# Patient Record
Sex: FEMALE | Race: WHITE | NOT HISPANIC OR LATINO | URBAN - METROPOLITAN AREA
[De-identification: names, ages, dates, MRNs, and addresses within clinical notes are randomized per-mention and may not be internally consistent; named-entity substitution may affect disease eponyms.]

---

## 2017-05-15 RX ORDER — SODIUM CHLORIDE 9 MG/ML
500 INJECTION INTRAMUSCULAR; INTRAVENOUS; SUBCUTANEOUS
Qty: 0 | Refills: 0 | Status: DISCONTINUED | OUTPATIENT
Start: 2017-05-16 | End: 2017-05-16

## 2017-05-15 NOTE — H&P ADULT - FAMILY HISTORY
Sibling  Still living? Unknown  Family history of pancreatic cancer, Age at diagnosis: Age Unknown  Family history of breast cancer, Age at diagnosis: Age Unknown  Family history of prostate cancer, Age at diagnosis: Age Unknown

## 2017-05-15 NOTE — H&P ADULT - ASSESSMENT
60 yo female, current heavy smoker (1.5 PPD x 50 years) with a PMHx of Bipolar disorder, schizoaffective disorder, panic attacks and depression, known non-obstructive CAD, COPD (prior hospitalizations, denies intubations), HTN, Hyperlipidemia, fibromyalgia, DMII, TIA (diagnosed two months prior; pt reports no deficits but is awaiting MRI/MRA of head for further work up) who presents for bilateral lower extremity angiogram with possible intervention if clinically indicated secondary to LE claudication and abnormal US/PVR findings.      ASA: III and Mallampati: III 62 yo female, current heavy smoker (1.5 PPD x 50 years) with a PMHx of Bipolar disorder, schizoaffective disorder, panic attacks and depression, known non-obstructive CAD, COPD (prior hospitalizations, denies intubations), HTN, Hyperlipidemia, fibromyalgia, DMII, TIA (diagnosed two months prior; pt reports no deficits but is awaiting MRI/MRA of head for further work up) who presents for bilateral lower extremity angiogram with possible intervention if clinically indicated secondary to LE claudication and abnormal US/PVR findings.      ASA: III and Mallampati: III  Pt was loaded with ASA 325mg po x 1 and Plavix 600mg PO X 1 pre-procedure.

## 2017-05-15 NOTE — H&P ADULT - HISTORY OF PRESENT ILLNESS
History obtained from patient (poor historian) and MD office note     60 yo female, current heavy smoker (1.5 PPD x 50 years) with a PMHx of Bipolar disorder, schizoaffective disorder, panic attacks and depression, known non-obstructive CAD, COPD (prior hospitalizations, denies intubations), HTN, Hyperlipidemia, fibromyalgia, DMII, TIA (diagnosed two months prior; pt reports no deficits but is awaiting MRI/MRA of head for further work up) presented to Dr. Yañez’s office with the complaint of bilateral leg claudication after walking one city block (R>L) relieved with rest x several years. Pt explains she experiences calf cramping and sharp shooting pain in lower back/buttocks region with radiation to upper thighs when ambulating. In recent months, her feet are cool to touch but denies skin breakdown, leg edema.  Arterial US (2017): Right: moderate arterial insufficiency due to infrapopliteal disease.  Left: No arterial Insufficiency. PVR on right le.82, PVR on left: 1.1. In light of patient’s risk factors, presenting symptoms and Arterial US/PVR findings, pt now presents for peripheral angiogram with possible intervention if clinically indicated.     Prior Cardiac Testing  Per MD office note: Echo (date unknown): LVEF 75%, no WMA, abnormal LV relaxation. Grade 1 diastolic dysfunction.   Prior NST (date unknown, documented in MD office note): negative for ischemia. History obtained from patient (poor historian) and MD office note     60 yo female, current heavy smoker (1.5 PPD x 50 years) with a PMHx of Bipolar disorder, schizoaffective disorder, panic attacks and depression, known non-obstructive CAD, COPD (prior hospitalizations, denies intubations), HTN, Hyperlipidemia, fibromyalgia, DMII, TIA (diagnosed two months prior; pt reports no deficits but is awaiting MRI/MRA of head for further work up) presented to Dr. Yañez’s office with the complaint of bilateral leg claudication after walking one city block (R>L) relieved with rest x several years. Pt explains she experiences calf cramping and sharp shooting pain in lower back/buttocks region with radiation to upper thighs when ambulating. In recent months, her feet are cool to touch but denies skin breakdown, leg edema.  Arterial US (2017): Right: moderate arterial insufficiency due to infrapopliteal disease.  Left: No arterial Insufficiency. PVR on right le.82, PVR on left: 1.1. In light of patient’s risk factors, presenting symptoms and Arterial US/PVR findings, pt now presents for peripheral angiogram with possible intervention if clinically indicated.      Prior Cardiac Testing  Per MD office note: Echo (date unknown): LVEF 75%, no WMA, abnormal LV relaxation. Grade 1 diastolic dysfunction.   Prior NST (date unknown, documented in MD office note): negative for ischemia.

## 2017-05-15 NOTE — H&P ADULT - PMH
Bipolar disorder    COPD (chronic obstructive pulmonary disease)    Hypertension    PVD (peripheral vascular disease)

## 2017-05-16 ENCOUNTER — OUTPATIENT (OUTPATIENT)
Dept: OUTPATIENT SERVICES | Facility: HOSPITAL | Age: 62
LOS: 1 days | Discharge: NURSING, REHAB OR LONG-TERM CARE | End: 2017-05-16
Payer: COMMERCIAL

## 2017-05-16 DIAGNOSIS — I73.9 PERIPHERAL VASCULAR DISEASE, UNSPECIFIED: ICD-10-CM

## 2017-05-16 DIAGNOSIS — F17.210 NICOTINE DEPENDENCE, CIGARETTES, UNCOMPLICATED: ICD-10-CM

## 2017-05-16 DIAGNOSIS — E78.5 HYPERLIPIDEMIA, UNSPECIFIED: ICD-10-CM

## 2017-05-16 DIAGNOSIS — Z82.49 FAMILY HISTORY OF ISCHEMIC HEART DISEASE AND OTHER DISEASES OF THE CIRCULATORY SYSTEM: ICD-10-CM

## 2017-05-16 DIAGNOSIS — E11.9 TYPE 2 DIABETES MELLITUS WITHOUT COMPLICATIONS: ICD-10-CM

## 2017-05-16 DIAGNOSIS — J98.4 OTHER DISORDERS OF LUNG: ICD-10-CM

## 2017-05-16 DIAGNOSIS — J33.8 OTHER POLYP OF SINUS: Chronic | ICD-10-CM

## 2017-05-16 DIAGNOSIS — I70.213 ATHEROSCLEROSIS OF NATIVE ARTERIES OF EXTREMITIES WITH INTERMITTENT CLAUDICATION, BILATERAL LEGS: ICD-10-CM

## 2017-05-16 DIAGNOSIS — Z90.89 ACQUIRED ABSENCE OF OTHER ORGANS: Chronic | ICD-10-CM

## 2017-05-16 DIAGNOSIS — I67.9 CEREBROVASCULAR DISEASE, UNSPECIFIED: ICD-10-CM

## 2017-05-16 DIAGNOSIS — N83.201 UNSPECIFIED OVARIAN CYST, RIGHT SIDE: Chronic | ICD-10-CM

## 2017-05-16 LAB
ANION GAP SERPL CALC-SCNC: 5 MMOL/L — LOW (ref 9–16)
APTT BLD: 28.1 SEC — SIGNIFICANT CHANGE UP (ref 27.5–37.4)
BASOPHILS NFR BLD AUTO: 1 % — SIGNIFICANT CHANGE UP (ref 0–2)
BUN SERPL-MCNC: 15 MG/DL — SIGNIFICANT CHANGE UP (ref 7–23)
CALCIUM SERPL-MCNC: 10 MG/DL — SIGNIFICANT CHANGE UP (ref 8.5–10.5)
CHLORIDE SERPL-SCNC: 99 MMOL/L — SIGNIFICANT CHANGE UP (ref 96–108)
CHOLEST SERPL-MCNC: 153 MG/DL — SIGNIFICANT CHANGE UP
CO2 SERPL-SCNC: 31 MMOL/L — SIGNIFICANT CHANGE UP (ref 22–31)
CREAT SERPL-MCNC: 0.79 MG/DL — SIGNIFICANT CHANGE UP (ref 0.5–1.3)
EOSINOPHIL NFR BLD AUTO: 1.4 % — SIGNIFICANT CHANGE UP (ref 0–6)
GLUCOSE SERPL-MCNC: 87 MG/DL — SIGNIFICANT CHANGE UP (ref 70–99)
HBA1C BLD-MCNC: 6.7 % — HIGH (ref 4.8–5.6)
HCT VFR BLD CALC: 38.9 % — SIGNIFICANT CHANGE UP (ref 34.5–45)
HDLC SERPL-MCNC: 25 MG/DL — LOW
HGB BLD-MCNC: 13.2 G/DL — SIGNIFICANT CHANGE UP (ref 11.5–15.5)
INR BLD: 1.02 — SIGNIFICANT CHANGE UP (ref 0.88–1.16)
LIPID PNL WITH DIRECT LDL SERPL: 87 MG/DL — SIGNIFICANT CHANGE UP
LYMPHOCYTES # BLD AUTO: 37.7 % — SIGNIFICANT CHANGE UP (ref 13–44)
MCHC RBC-ENTMCNC: 29.5 PG — SIGNIFICANT CHANGE UP (ref 27–34)
MCHC RBC-ENTMCNC: 33.9 G/DL — SIGNIFICANT CHANGE UP (ref 32–36)
MCV RBC AUTO: 87 FL — SIGNIFICANT CHANGE UP (ref 80–100)
MONOCYTES NFR BLD AUTO: 6.9 % — SIGNIFICANT CHANGE UP (ref 2–14)
NEUTROPHILS NFR BLD AUTO: 53 % — SIGNIFICANT CHANGE UP (ref 43–77)
PLATELET # BLD AUTO: 385 K/UL — SIGNIFICANT CHANGE UP (ref 150–400)
POTASSIUM SERPL-MCNC: 4.6 MMOL/L — SIGNIFICANT CHANGE UP (ref 3.5–5.3)
POTASSIUM SERPL-SCNC: 4.6 MMOL/L — SIGNIFICANT CHANGE UP (ref 3.5–5.3)
PROTHROM AB SERPL-ACNC: 11.3 SEC — SIGNIFICANT CHANGE UP (ref 9.8–12.7)
RBC # BLD: 4.47 M/UL — SIGNIFICANT CHANGE UP (ref 3.8–5.2)
RBC # FLD: 14.4 % — SIGNIFICANT CHANGE UP (ref 10.3–16.9)
SODIUM SERPL-SCNC: 135 MMOL/L — SIGNIFICANT CHANGE UP (ref 135–145)
TOTAL CHOLESTEROL/HDL RATIO MEASUREMENT: 6.1 RATIO — HIGH
TRIGL SERPL-MCNC: 203 MG/DL — HIGH
WBC # BLD: 8.9 K/UL — SIGNIFICANT CHANGE UP (ref 3.8–10.5)
WBC # FLD AUTO: 8.9 K/UL — SIGNIFICANT CHANGE UP (ref 3.8–10.5)

## 2017-05-16 PROCEDURE — 93010 ELECTROCARDIOGRAM REPORT: CPT

## 2017-05-16 PROCEDURE — 85730 THROMBOPLASTIN TIME PARTIAL: CPT

## 2017-05-16 PROCEDURE — C1887: CPT

## 2017-05-16 PROCEDURE — C1769: CPT

## 2017-05-16 PROCEDURE — 36245 INS CATH ABD/L-EXT ART 1ST: CPT

## 2017-05-16 PROCEDURE — 36246 INS CATH ABD/L-EXT ART 2ND: CPT | Mod: 50

## 2017-05-16 PROCEDURE — C1760: CPT

## 2017-05-16 PROCEDURE — C1894: CPT

## 2017-05-16 PROCEDURE — 83036 HEMOGLOBIN GLYCOSYLATED A1C: CPT

## 2017-05-16 PROCEDURE — 85610 PROTHROMBIN TIME: CPT

## 2017-05-16 PROCEDURE — 80061 LIPID PANEL: CPT

## 2017-05-16 PROCEDURE — 85025 COMPLETE CBC W/AUTO DIFF WBC: CPT

## 2017-05-16 PROCEDURE — 80048 BASIC METABOLIC PNL TOTAL CA: CPT

## 2017-05-16 PROCEDURE — 93005 ELECTROCARDIOGRAM TRACING: CPT

## 2017-05-16 PROCEDURE — 76937 US GUIDE VASCULAR ACCESS: CPT | Mod: 26

## 2017-05-16 PROCEDURE — 75716 ARTERY X-RAYS ARMS/LEGS: CPT | Mod: 26,59

## 2017-05-16 RX ORDER — CLOPIDOGREL BISULFATE 75 MG/1
1 TABLET, FILM COATED ORAL
Qty: 30 | Refills: 5 | OUTPATIENT
Start: 2017-05-16 | End: 2017-11-11

## 2017-05-16 RX ORDER — SIMVASTATIN 20 MG/1
1 TABLET, FILM COATED ORAL
Qty: 0 | Refills: 0 | COMMUNITY

## 2017-05-16 RX ORDER — CLOPIDOGREL BISULFATE 75 MG/1
600 TABLET, FILM COATED ORAL ONCE
Qty: 0 | Refills: 0 | Status: COMPLETED | OUTPATIENT
Start: 2017-05-16 | End: 2017-05-16

## 2017-05-16 RX ORDER — CHLORHEXIDINE GLUCONATE 213 G/1000ML
1 SOLUTION TOPICAL ONCE
Qty: 0 | Refills: 0 | Status: DISCONTINUED | OUTPATIENT
Start: 2017-05-16 | End: 2017-05-16

## 2017-05-16 RX ORDER — ASPIRIN/CALCIUM CARB/MAGNESIUM 324 MG
1 TABLET ORAL
Qty: 30 | Refills: 5 | OUTPATIENT
Start: 2017-05-16 | End: 2017-11-11

## 2017-05-16 RX ORDER — ACETAMINOPHEN 500 MG
650 TABLET ORAL ONCE
Qty: 0 | Refills: 0 | Status: COMPLETED | OUTPATIENT
Start: 2017-05-16 | End: 2017-05-16

## 2017-05-16 RX ORDER — NICOTINE POLACRILEX 2 MG
1 GUM BUCCAL
Qty: 0 | Refills: 0 | COMMUNITY

## 2017-05-16 RX ORDER — ASPIRIN/CALCIUM CARB/MAGNESIUM 324 MG
325 TABLET ORAL ONCE
Qty: 0 | Refills: 0 | Status: COMPLETED | OUTPATIENT
Start: 2017-05-16 | End: 2017-05-16

## 2017-05-16 RX ADMIN — SODIUM CHLORIDE 75 MILLILITER(S): 9 INJECTION INTRAMUSCULAR; INTRAVENOUS; SUBCUTANEOUS at 14:11

## 2017-05-16 RX ADMIN — Medication 650 MILLIGRAM(S): at 18:54

## 2017-05-16 RX ADMIN — CLOPIDOGREL BISULFATE 600 MILLIGRAM(S): 75 TABLET, FILM COATED ORAL at 15:35

## 2017-05-16 RX ADMIN — Medication 325 MILLIGRAM(S): at 15:36

## 2017-05-16 NOTE — CHART NOTE - NSCHARTNOTEFT_GEN_A_CORE
Pt c/o some shortness of breath post cath, pt states that she is chronically short of breath with audible wheezing 2/2 copd/asthma. on exam all vitals wnl, o2 sat 96% on room air, and no accessory muscle usage, lying with head of bed 30 degrees. Expiratory wheezes in b/l lung fields. Pt took albuterol inhaler, expiratory wheezing improved mildly.   Patient also complaining of R toe numbness (access was on left groin/no intervention) but states it is likely positional. Pulses 2+ PT/DP b/l LE, no LE edema, sensation intact b/l and strength 5/5 and equal b/l.   Will proceed with d/c if patient able to ambulate, symptoms improve. Pt c/o some shortness of breath post cath, pt states that she is chronically short of breath with audible wheezing 2/2 copd/asthma. Also states she is extremely anxious and has missed doses on Klonopin. Took home dose.   On exam all vitals wnl, o2 sat 96% on room air, and no accessory muscle usage, lying with head of bed 30 degrees. Expiratory wheezes in b/l lung fields. Pt took albuterol inhaler, expiratory wheezing improved mildly.   Patient also complaining of R toe numbness (access was on left groin/no intervention) but states it is likely positional. Pulses 2+ PT/DP b/l LE, no LE edema, sensation intact b/l and strength 5/5 and equal b/l.   Will proceed with d/c if patient able to ambulate, symptoms improve. Instructed to return to the emergency room/call 911 if symptoms do not improve/worsen, including not limited to any shortness of breath, chest pain, numbness/tingling, fever, chills. Pt c/o some shortness of breath post cath, pt states that she is chronically short of breath with audible wheezing 2/2 copd/asthma. Also states she is extremely anxious and has missed doses on Klonopin. Took home dose.   On exam all vitals wnl, o2 sat 96% on room air, and no accessory muscle usage, lying with head of bed 30 degrees. Expiratory wheezes in b/l lung fields. Pt took albuterol inhaler, expiratory wheezing improved mildly.   Patient also complaining of R toe numbness (access was on left groin/no intervention) but states it is likely positional. Pulses 2+ PT/DP b/l LE, no LE edema, sensation intact b/l and strength 5/5 and equal b/l.   Will proceed with d/c if patient able to ambulate, symptoms improve. Instructed to return to the emergency room/call 911 if symptoms do not improve/worsen, including not limited to any shortness of breath, chest pain, numbness/tingling, groin pain/swelling, bleeding, fever, chills.  Discussed with Dr. Yañez, patient will follow up with him this week and agrees with above plan.

## 2017-05-16 NOTE — PROGRESS NOTE ADULT - SUBJECTIVE AND OBJECTIVE BOX
Interventional Cardiology PA SDA Discharge Note    Patient without complaints. Ambulated and voided without difficulties    Afebrile, VSS    Ext:    		Right/Left             Groin:       hematoma,     bruit, dressing; C/D/I  		Right/Left              Radial :    hematoma,     bleeding, dressing; C/D/I      Pulses:    intact RAD/DP/PT?to baseline     A/P:  60 yo female, current heavy smoker (1.5 PPD x 50 years) with a PMHx of Bipolar disorder, schizoaffective disorder, panic attacks and depression, known non-obstructive CAD, COPD (prior hospitalizations, denies intubations), HTN, Hyperlipidemia, fibromyalgia, DMII, TIA (diagnosed two months prior; pt reports no deficits but is awaiting MRI/MRA of head for further work up) presented to Dr. Yañez’s office with the complaint of bilateral leg claudication after walking one city block (R>L) relieved with rest x several years. Pt explains she experiences calf cramping and sharp shooting pain in lower back/buttocks region with radiation to upper thighs when ambulating. In recent months, her feet are cool to touch but denies skin breakdown, leg edema.  Arterial US (2017): Right: moderate arterial insufficiency due to infrapopliteal disease.  Left: No arterial Insufficiency. PVR on right le.82, PVR on left: 1.1. In light of patient’s risk factors, presenting symptoms and Arterial US/PVR findings, pt now presents for peripheral angiogram with possible intervention if clinically indicated.                  1.	Stable for discharge as per attending Dr. _________ after bed rest, pt voids, groin/wrist stable and 30 minutes of ambulation.  2.	Follow-up with PMD/Cardiologist ___________ in 1-2 weeks  3.	Discharged forms signed and copies in chart Interventional Cardiology PA SDA Discharge Note    Patient without complaints. Ambulated and voided without difficulties    Afebrile, VSS    Ext:    		Right/Left        L     Groin: no      hematoma, no  bruit, dressing; C/D/I  		Right/Left              Radial :    hematoma,     bleeding, dressing; C/D/I      Pulses:    intact RAD/DP/PT?to baseline     A/P:  62 yo female, current heavy smoker (1.5 PPD x 50 years) with a PMHx of Bipolar disorder, schizoaffective disorder, panic attacks and depression, known non-obstructive CAD, COPD (prior hospitalizations, denies intubations), HTN, Hyperlipidemia, fibromyalgia, DMII, TIA (diagnosed two months prior; pt reports no deficits but is awaiting MRI/MRA of head for further work up) presented to Dr. Yañez’s office with the complaint of bilateral leg claudication after walking one city block (R>L) relieved with rest x several years. Pt explains she experiences calf cramping and sharp shooting pain in lower back/buttocks region with radiation to upper thighs when ambulating. In recent months, her feet are cool to touch but denies skin breakdown, leg edema.  Arterial US (2017): Right: moderate arterial insufficiency due to infrapopliteal disease.  Left: No arterial Insufficiency. PVR on right le.82, PVR on left: 1.1. In light of patient’s risk factors, presenting symptoms and Arterial US/PVR findings, pt now presents for peripheral angiogram with possible intervention if clinically indicated.    s/p peripheral angiogram diagnostic revealing 70% RCIA, L groin perclose, 60mmHg gradient, patient will return to Dr. Yañez office Friday and then scheduled for staged intervention of known lesion. ASA, Plavix Rx to pharmacy.       1.	Stable for discharge as per attending Dr. Yañez after bed rest, pt voids, groin/wrist stable and 30 minutes of ambulation.  2.	Follow-up with PMD/Cardiologist Pari in 1-2 weeks  3.	Discharged forms signed and copies in chart

## 2017-05-30 PROBLEM — F31.9 BIPOLAR DISORDER, UNSPECIFIED: Chronic | Status: ACTIVE | Noted: 2017-05-15

## 2017-05-30 PROBLEM — J44.9 CHRONIC OBSTRUCTIVE PULMONARY DISEASE, UNSPECIFIED: Chronic | Status: ACTIVE | Noted: 2017-05-15

## 2017-05-30 PROBLEM — I10 ESSENTIAL (PRIMARY) HYPERTENSION: Chronic | Status: ACTIVE | Noted: 2017-05-15

## 2017-05-30 PROBLEM — I73.9 PERIPHERAL VASCULAR DISEASE, UNSPECIFIED: Chronic | Status: ACTIVE | Noted: 2017-05-15

## 2017-06-23 VITALS
OXYGEN SATURATION: 96 % | SYSTOLIC BLOOD PRESSURE: 128 MMHG | HEART RATE: 85 BPM | TEMPERATURE: 97 F | RESPIRATION RATE: 16 BRPM | DIASTOLIC BLOOD PRESSURE: 74 MMHG

## 2017-06-23 RX ORDER — CHLORHEXIDINE GLUCONATE 213 G/1000ML
1 SOLUTION TOPICAL ONCE
Qty: 0 | Refills: 0 | Status: DISCONTINUED | OUTPATIENT
Start: 2017-07-10 | End: 2017-07-11

## 2017-06-23 NOTE — H&P ADULT - PMH
Bipolar disorder    COPD (chronic obstructive pulmonary disease)    Hypertension    PVD (peripheral vascular disease) Anxiety    Bipolar disorder    COPD (chronic obstructive pulmonary disease)    Depression    Diabetes mellitus    Fibromyalgia    Hypertension    PVD (peripheral vascular disease)    Schizoaffective disorder

## 2017-06-23 NOTE — H&P ADULT - ASSESSMENT
60 y/o female, POOR HISTORIAN, Current heavy smoker (1.5 PPD x 50 years) with a PMHx of Bipolar disorder, schizoaffective disorder, panic attacks and depression, known non-obstructive CAD, COPD (prior hospitalizations most recently June 2017, denies intubations), HTN, Hyperlipidemia, fibromyalgia, DMII, TIA 03/2017 ( no residual deficits), PVD s/p diagnostic peripheral angiogram @ [facility] on 05/16/17 revealing 70% RCIA  stenosis, who returns for staged PTA/stent. In light of pt's risk factors, Known PVD, continuing disabling claudicating symptoms, pt is now returns for stage PTA/stent of known RCIA lesion.       Patient is compliant with her daily ASA 81 mg and Plavix 75 mg regimen, last dose this AM prior to arrival. Patient loaded with  mg prior to procedure. IVF @ 75cc/hr.     ASA III and Mallampati III  Risks & benefits of procedure and alternative therapy have been explained to the patient including but not limited to: allergic reaction, bleeding w/possible need for blood transfusion, infection, renal and vascular compromise, limb damage, stroke, Myocardial infarction, vessel dissection/perforation, emergent Vascular Surgery. Informed consent obtained and in chart.

## 2017-06-23 NOTE — H&P ADULT - HISTORY OF PRESENT ILLNESS
History obtained from patient (poor historian) and MD office note     61 y.o Female, POOR HISTORIAN, Current heavy smoker (1.5 PPD x 50 years) with a PMHx of Bipolar disorder, schizoaffective disorder, panic attacks and depression, known non-obstructive CAD, COPD (prior hospitalizations, denies intubations), HTN, Hyperlipidemia, fibromyalgia, DMII, TIA (diagnosed two months prior; pt reports no deficits        Denies CP,  SOB, palpitations, dizziness, syncope, recent PND/orthopnea, LE edema, or decrease in exercise tolerance.        In light of pt's risk factors, above CCS Anginal Class _____ symptoms, and an abnormal Stress test, pt is now referred to [facility] for recommended Cardiac Cath with possible intervention if clinically indicated to  r/o suspected underlying CAD.        Prior Cardiac Testing  Per MD office note: Echo (date unknown): LVEF 75%, no WMA, abnormal LV relaxation. Grade 1 diastolic dysfunction.   Prior NST (date unknown, documented in MD office note): negative for ischemia. History obtained from patient (poor historian) and MD office note     61 y.o Female, POOR HISTORIAN, Current heavy smoker (1.5 PPD x 50 years) with a PMHx of Bipolar disorder, schizoaffective disorder, panic attacks and depression, known non-obstructive CAD, COPD (prior hospitalizations, denies intubations), HTN, Hyperlipidemia, fibromyalgia, DMII, TIA (diagnosed two months prior; pt reports no deficits         Denies B/L LE rest pain, paresthesias, Skin discoloration, skin break down/ non healing ulcers, hair loss,  LE coldness, or LE edema.      In light of pt's risk factors, Known PVD, above disabling claudicating symptoms,     Prior Cardiac Testing  Per MD office note: Echo (date unknown): LVEF 75%, no WMA, abnormal LV relaxation. Grade 1 diastolic dysfunction.   Prior NST (date unknown, documented in MD office note): negative for ischemia. ****SKELETON- HX WAS NOT UPDATED AS PT CANCELLED PROCEDURE ON 17 DUE TO RECENT HOSPITALIZATION FOR COPD EXACERBATION     *****HISTORY UPDATED FROM PREVIOUS ADMISSION AND FROM PATIENT- POOR HISTORIAN       61 y.o female, POOR HISTORIAN, Current heavy smoker (1.5 PPD x 50 years) with a PMHx of Bipolar disorder, schizoaffective disorder, panic attacks and depression, known non-obstructive CAD, COPD (prior hospitalizations, denies intubations), HTN, Hyperlipidemia, fibromyalgia, DMII, TIA 2017 ( no residual deficits), PVD s/p diagnostic peripheral angiogram @ [facility] on 17 revealing 70% RCIA  stenosis, who returns for stage PTA/stent. She  continues to c./o of his initial presenting symptoms of bilateral leg claudication after walking one city block (R>L) relieved with rest x several years. Pt explains she experiences calf cramping and sharp shooting pain in lower back/buttocks region with radiation to upper thighs when ambulating. In recent months, her feet are cool to touch but denies skin breakdown, leg edema.  Arterial US (2017): Right: moderate arterial insufficiency due to infrapopliteal disease.  Left: No arterial Insufficiency. PVR on right le.82, PVR on left: 1.1. In light of patient’s risk factors, presenting symptoms and Arterial US/PVR findings, pt now presents for peripheral angiogram with possible intervention if clinically indicated.      In light of pt's risk factors, Known PVD, continuing above disabling claudicating symptoms, pt is now returns for stage PTA/stent of known RCIA lesion.       Prior Cardiac Testing  Per MD office note: Echo (date unknown): LVEF 75%, no WMA, abnormal LV relaxation. Grade 1 diastolic dysfunction.   Prior NST (date unknown, documented in MD office note): negative for ischemia. *****HISTORY UPDATED FROM PREVIOUS ADMISSION AND FROM PATIENT- POOR HISTORIAN     61 y.o female, POOR HISTORIAN, Current heavy smoker (1.5 PPD x 50 years) with a PMHx of Bipolar disorder, schizoaffective disorder, panic attacks and depression, known non-obstructive CAD, COPD (prior hospitalizations, denies intubations), HTN, Hyperlipidemia, fibromyalgia, DMII, TIA 2017 ( no residual deficits), PVD s/p diagnostic peripheral angiogram @ [facility] on 17 revealing 70% RCIA  stenosis, who returns for stage PTA/stent. She  continues to c./o of his initial presenting symptoms of bilateral leg claudication after walking one city block (R>L) relieved with rest x several years. Pt explains she experiences calf cramping and sharp shooting pain in lower back/buttocks region with radiation to upper thighs when ambulating. In recent months, her feet are cool to touch but denies skin breakdown, leg edema.  Arterial US (2017): Right: moderate arterial insufficiency due to infrapopliteal disease.  Left: No arterial Insufficiency. PVR on right le.82, PVR on left: 1.1. In light of patient’s risk factors, presenting symptoms and Arterial US/PVR findings, pt now presents for peripheral angiogram with possible intervention if clinically indicated.      In light of pt's risk factors, Known PVD, continuing above disabling claudicating symptoms, pt is now returns for stage PTA/stent of known RCIA lesion.       Prior Cardiac Testing  Per MD office note: Echo (date unknown): LVEF 75%, no WMA, abnormal LV relaxation. Grade 1 diastolic dysfunction.   Prior NST (date unknown, documented in MD office note): negative for ischemia. 61 y.o female, POOR HISTORIAN, Current heavy smoker (1.5 PPD x 50 years) with a PMHx of Bipolar disorder, schizoaffective disorder, panic attacks and depression, known non-obstructive CAD, COPD (prior hospitalizations most recently 2017, denies intubations), HTN, Hyperlipidemia, fibromyalgia, DMII, TIA 2017 ( no residual deficits), PVD s/p diagnostic peripheral angiogram @ [facility] on 17 revealing 70% RCIA  stenosis, who returns for stage PTA/stent. She continues to c./o of his initial presenting symptoms of bilateral leg claudication after walking one city block (R>L) relieved with rest x several years. Pt explains she experiences calf cramping and sharp shooting pain in lower back/buttocks region with radiation to upper thighs when ambulating. In recent months, her feet are cool to touch but denies skin breakdown, leg edema.  Arterial US (2017): Right: moderate arterial insufficiency due to infrapopliteal disease.  Left: No arterial Insufficiency. PVR on right le.82, PVR on left: 1.1. In light of patient’s risk factors, presenting symptoms and Arterial US/PVR findings, pt now presents for peripheral angiogram with possible intervention if clinically indicated.      In light of pt's risk factors, Known PVD, continuing above disabling claudicating symptoms, pt is now returns for stage PTA/stent of known RCIA lesion.       Prior Cardiac Testing  Per MD office note: Echo (date unknown): LVEF 75%, no WMA, abnormal LV relaxation. Grade 1 diastolic dysfunction.   Prior NST (date unknown, documented in MD office note): negative for ischemia. 61 y.o female, POOR HISTORIAN, Current heavy smoker (1.5 PPD x 50 years) with a PMHx of Bipolar disorder, schizoaffective disorder, panic attacks and depression, known non-obstructive CAD, COPD (prior hospitalizations most recently 2017, denies intubations), HTN, Hyperlipidemia, fibromyalgia, DMII, TIA 2017 ( no residual deficits), PVD s/p diagnostic peripheral angiogram @ [facility] on 17 revealing 70% RCIA  stenosis, who returns for stage PTA/stent. She continues to c/o of his initial presenting symptoms of bilateral leg claudication after walking one city block (R>L) relieved with rest x several years. Pt explains she experiences calf cramping and sharp shooting pain in lower back/buttocks region with radiation to upper thighs when ambulating. In recent months, her feet are cool to touch but denies skin breakdown, leg edema.  Arterial US (2017): Right: moderate arterial insufficiency due to infrapopliteal disease.  Left: No arterial Insufficiency. PVR on right le.82, PVR on left: 1.1. In light of patient’s risk factors, presenting symptoms and Arterial US/PVR findings, pt now presents for peripheral angiogram with possible intervention if clinically indicated.      In light of pt's risk factors, Known PVD, continuing above disabling claudicating symptoms, pt is now returns for stage PTA/stent of known RCIA lesion.       Prior Cardiac Testing  Per MD office note: Echo (date unknown): LVEF 75%, no WMA, abnormal LV relaxation. Grade 1 diastolic dysfunction.   Prior NST (date unknown, documented in MD office note): negative for ischemia.

## 2017-07-10 ENCOUNTER — INPATIENT (INPATIENT)
Facility: HOSPITAL | Age: 62
LOS: 0 days | Discharge: HOME | DRG: 254 | End: 2017-07-11
Attending: INTERNAL MEDICINE | Admitting: INTERNAL MEDICINE
Payer: COMMERCIAL

## 2017-07-10 DIAGNOSIS — J33.8 OTHER POLYP OF SINUS: Chronic | ICD-10-CM

## 2017-07-10 DIAGNOSIS — Z90.89 ACQUIRED ABSENCE OF OTHER ORGANS: Chronic | ICD-10-CM

## 2017-07-10 DIAGNOSIS — N83.201 UNSPECIFIED OVARIAN CYST, RIGHT SIDE: Chronic | ICD-10-CM

## 2017-07-10 LAB
ALBUMIN SERPL ELPH-MCNC: 4.2 G/DL — SIGNIFICANT CHANGE UP (ref 3.3–5)
ALP SERPL-CCNC: 37 U/L — LOW (ref 40–120)
ALT FLD-CCNC: 36 U/L — SIGNIFICANT CHANGE UP (ref 10–45)
ANION GAP SERPL CALC-SCNC: 12 MMOL/L — SIGNIFICANT CHANGE UP (ref 5–17)
APTT BLD: 28.2 SEC — SIGNIFICANT CHANGE UP (ref 27.5–37.4)
AST SERPL-CCNC: 35 U/L — SIGNIFICANT CHANGE UP (ref 10–40)
BASOPHILS NFR BLD AUTO: 0.6 % — SIGNIFICANT CHANGE UP (ref 0–2)
BILIRUB SERPL-MCNC: 0.3 MG/DL — SIGNIFICANT CHANGE UP (ref 0.2–1.2)
BUN SERPL-MCNC: 10 MG/DL — SIGNIFICANT CHANGE UP (ref 7–23)
CALCIUM SERPL-MCNC: 10.6 MG/DL — HIGH (ref 8.4–10.5)
CHLORIDE SERPL-SCNC: 96 MMOL/L — SIGNIFICANT CHANGE UP (ref 96–108)
CHOLEST SERPL-MCNC: 132 MG/DL — SIGNIFICANT CHANGE UP (ref 10–199)
CO2 SERPL-SCNC: 29 MMOL/L — SIGNIFICANT CHANGE UP (ref 22–31)
CREAT SERPL-MCNC: 0.8 MG/DL — SIGNIFICANT CHANGE UP (ref 0.5–1.3)
EOSINOPHIL NFR BLD AUTO: 1.5 % — SIGNIFICANT CHANGE UP (ref 0–6)
GLUCOSE SERPL-MCNC: 91 MG/DL — SIGNIFICANT CHANGE UP (ref 70–99)
HBA1C BLD-MCNC: 6.1 % — HIGH (ref 4–5.6)
HCT VFR BLD CALC: 39.1 % — SIGNIFICANT CHANGE UP (ref 34.5–45)
HDLC SERPL-MCNC: 25 MG/DL — LOW (ref 40–125)
HGB BLD-MCNC: 13.2 G/DL — SIGNIFICANT CHANGE UP (ref 11.5–15.5)
INR BLD: 1.04 — SIGNIFICANT CHANGE UP (ref 0.88–1.16)
LIPID PNL WITH DIRECT LDL SERPL: 69 MG/DL — SIGNIFICANT CHANGE UP
LYMPHOCYTES # BLD AUTO: 36.1 % — SIGNIFICANT CHANGE UP (ref 13–44)
MCHC RBC-ENTMCNC: 29.6 PG — SIGNIFICANT CHANGE UP (ref 27–34)
MCHC RBC-ENTMCNC: 33.8 G/DL — SIGNIFICANT CHANGE UP (ref 32–36)
MCV RBC AUTO: 87.7 FL — SIGNIFICANT CHANGE UP (ref 80–100)
MONOCYTES NFR BLD AUTO: 9.3 % — SIGNIFICANT CHANGE UP (ref 2–14)
NEUTROPHILS NFR BLD AUTO: 52.5 % — SIGNIFICANT CHANGE UP (ref 43–77)
PLATELET # BLD AUTO: 386 K/UL — SIGNIFICANT CHANGE UP (ref 150–400)
POTASSIUM SERPL-MCNC: 5 MMOL/L — SIGNIFICANT CHANGE UP (ref 3.5–5.3)
POTASSIUM SERPL-SCNC: 5 MMOL/L — SIGNIFICANT CHANGE UP (ref 3.5–5.3)
PROT SERPL-MCNC: 7.4 G/DL — SIGNIFICANT CHANGE UP (ref 6–8.3)
PROTHROM AB SERPL-ACNC: 11.6 SEC — SIGNIFICANT CHANGE UP (ref 9.8–12.7)
RBC # BLD: 4.46 M/UL — SIGNIFICANT CHANGE UP (ref 3.8–5.2)
RBC # FLD: 14.6 % — SIGNIFICANT CHANGE UP (ref 10.3–16.9)
SODIUM SERPL-SCNC: 137 MMOL/L — SIGNIFICANT CHANGE UP (ref 135–145)
TOTAL CHOLESTEROL/HDL RATIO MEASUREMENT: 5.3 RATIO — SIGNIFICANT CHANGE UP (ref 3.3–7.1)
TRIGL SERPL-MCNC: 189 MG/DL — HIGH (ref 10–149)
WBC # BLD: 8.2 K/UL — SIGNIFICANT CHANGE UP (ref 3.8–10.5)
WBC # FLD AUTO: 8.2 K/UL — SIGNIFICANT CHANGE UP (ref 3.8–10.5)

## 2017-07-10 PROCEDURE — 93010 ELECTROCARDIOGRAM REPORT: CPT

## 2017-07-10 PROCEDURE — 75630 X-RAY AORTA LEG ARTERIES: CPT | Mod: 26,59

## 2017-07-10 PROCEDURE — 37221: CPT | Mod: LT

## 2017-07-10 RX ORDER — MONTELUKAST 4 MG/1
10 TABLET, CHEWABLE ORAL DAILY
Qty: 0 | Refills: 0 | Status: DISCONTINUED | OUTPATIENT
Start: 2017-07-10 | End: 2017-07-11

## 2017-07-10 RX ORDER — FENOFIBRATE,MICRONIZED 130 MG
160 CAPSULE ORAL DAILY
Qty: 0 | Refills: 0 | Status: DISCONTINUED | OUTPATIENT
Start: 2017-07-10 | End: 2017-07-10

## 2017-07-10 RX ORDER — METOPROLOL TARTRATE 50 MG
100 TABLET ORAL DAILY
Qty: 0 | Refills: 0 | Status: DISCONTINUED | OUTPATIENT
Start: 2017-07-10 | End: 2017-07-11

## 2017-07-10 RX ORDER — BENZTROPINE MESYLATE 1 MG
1 TABLET ORAL
Qty: 0 | Refills: 0 | COMMUNITY

## 2017-07-10 RX ORDER — DIVALPROEX SODIUM 500 MG/1
3 TABLET, DELAYED RELEASE ORAL
Qty: 0 | Refills: 0 | COMMUNITY

## 2017-07-10 RX ORDER — GABAPENTIN 400 MG/1
300 CAPSULE ORAL THREE TIMES A DAY
Qty: 0 | Refills: 0 | Status: DISCONTINUED | OUTPATIENT
Start: 2017-07-10 | End: 2017-07-11

## 2017-07-10 RX ORDER — CLONAZEPAM 1 MG
1 TABLET ORAL
Qty: 0 | Refills: 0 | COMMUNITY

## 2017-07-10 RX ORDER — ASPIRIN/CALCIUM CARB/MAGNESIUM 324 MG
81 TABLET ORAL DAILY
Qty: 0 | Refills: 0 | Status: DISCONTINUED | OUTPATIENT
Start: 2017-07-10 | End: 2017-07-11

## 2017-07-10 RX ORDER — AMLODIPINE BESYLATE 2.5 MG/1
1 TABLET ORAL
Qty: 0 | Refills: 0 | COMMUNITY

## 2017-07-10 RX ORDER — NICOTINE POLACRILEX 2 MG
1 GUM BUCCAL
Qty: 0 | Refills: 0 | COMMUNITY

## 2017-07-10 RX ORDER — MONTELUKAST 4 MG/1
1 TABLET, CHEWABLE ORAL
Qty: 0 | Refills: 0 | COMMUNITY

## 2017-07-10 RX ORDER — SODIUM CHLORIDE 9 MG/ML
500 INJECTION INTRAMUSCULAR; INTRAVENOUS; SUBCUTANEOUS
Qty: 0 | Refills: 0 | Status: DISCONTINUED | OUTPATIENT
Start: 2017-07-10 | End: 2017-07-10

## 2017-07-10 RX ORDER — BENZTROPINE MESYLATE 1 MG
0.5 TABLET ORAL
Qty: 0 | Refills: 0 | Status: DISCONTINUED | OUTPATIENT
Start: 2017-07-10 | End: 2017-07-11

## 2017-07-10 RX ORDER — CLONAZEPAM 1 MG
0.5 TABLET ORAL
Qty: 0 | Refills: 0 | Status: DISCONTINUED | OUTPATIENT
Start: 2017-07-10 | End: 2017-07-11

## 2017-07-10 RX ORDER — QUETIAPINE FUMARATE 200 MG/1
200 TABLET, FILM COATED ORAL AT BEDTIME
Qty: 0 | Refills: 0 | Status: DISCONTINUED | OUTPATIENT
Start: 2017-07-10 | End: 2017-07-11

## 2017-07-10 RX ORDER — PSYLLIUM SEED (WITH DEXTROSE)
0 POWDER (GRAM) ORAL
Qty: 0 | Refills: 0 | COMMUNITY

## 2017-07-10 RX ORDER — LISINOPRIL 2.5 MG/1
1 TABLET ORAL
Qty: 0 | Refills: 0 | COMMUNITY

## 2017-07-10 RX ORDER — QUETIAPINE FUMARATE 200 MG/1
50 TABLET, FILM COATED ORAL
Qty: 0 | Refills: 0 | Status: DISCONTINUED | OUTPATIENT
Start: 2017-07-10 | End: 2017-07-11

## 2017-07-10 RX ORDER — ASPIRIN/CALCIUM CARB/MAGNESIUM 324 MG
243 TABLET ORAL ONCE
Qty: 0 | Refills: 0 | Status: COMPLETED | OUTPATIENT
Start: 2017-07-10 | End: 2017-07-10

## 2017-07-10 RX ORDER — DIVALPROEX SODIUM 500 MG/1
1 TABLET, DELAYED RELEASE ORAL
Qty: 0 | Refills: 0 | COMMUNITY

## 2017-07-10 RX ORDER — METOPROLOL TARTRATE 50 MG
1 TABLET ORAL
Qty: 0 | Refills: 0 | COMMUNITY

## 2017-07-10 RX ORDER — CLOMIPRAMINE HYDROCHLORIDE 50 MG/1
1 CAPSULE ORAL
Qty: 0 | Refills: 0 | COMMUNITY

## 2017-07-10 RX ORDER — SERTRALINE 25 MG/1
50 TABLET, FILM COATED ORAL DAILY
Qty: 0 | Refills: 0 | Status: DISCONTINUED | OUTPATIENT
Start: 2017-07-10 | End: 2017-07-11

## 2017-07-10 RX ORDER — CLOPIDOGREL BISULFATE 75 MG/1
75 TABLET, FILM COATED ORAL DAILY
Qty: 0 | Refills: 0 | Status: DISCONTINUED | OUTPATIENT
Start: 2017-07-10 | End: 2017-07-11

## 2017-07-10 RX ORDER — GABAPENTIN 400 MG/1
1 CAPSULE ORAL
Qty: 0 | Refills: 0 | COMMUNITY

## 2017-07-10 RX ORDER — ALBUTEROL 90 UG/1
1 AEROSOL, METERED ORAL
Qty: 0 | Refills: 0 | Status: DISCONTINUED | OUTPATIENT
Start: 2017-07-10 | End: 2017-07-11

## 2017-07-10 RX ORDER — SERTRALINE 25 MG/1
1 TABLET, FILM COATED ORAL
Qty: 0 | Refills: 0 | COMMUNITY

## 2017-07-10 RX ORDER — FENOFIBRATE,MICRONIZED 130 MG
1 CAPSULE ORAL
Qty: 0 | Refills: 0 | COMMUNITY

## 2017-07-10 RX ORDER — DIVALPROEX SODIUM 500 MG/1
250 TABLET, DELAYED RELEASE ORAL DAILY
Qty: 0 | Refills: 0 | Status: DISCONTINUED | OUTPATIENT
Start: 2017-07-10 | End: 2017-07-11

## 2017-07-10 RX ORDER — QUETIAPINE FUMARATE 200 MG/1
1 TABLET, FILM COATED ORAL
Qty: 0 | Refills: 0 | COMMUNITY

## 2017-07-10 RX ORDER — MOMETASONE FUROATE AND FORMOTEROL FUMARATE DIHYDRATE 200; 5 UG/1; UG/1
2 AEROSOL RESPIRATORY (INHALATION)
Qty: 0 | Refills: 0 | COMMUNITY

## 2017-07-10 RX ORDER — FLUPHENAZINE HYDROCHLORIDE 1 MG/1
5 TABLET, FILM COATED ORAL
Qty: 0 | Refills: 0 | Status: DISCONTINUED | OUTPATIENT
Start: 2017-07-10 | End: 2017-07-11

## 2017-07-10 RX ORDER — ERGOCALCIFEROL 1.25 MG/1
1 CAPSULE ORAL
Qty: 0 | Refills: 0 | COMMUNITY

## 2017-07-10 RX ORDER — BUDESONIDE AND FORMOTEROL FUMARATE DIHYDRATE 160; 4.5 UG/1; UG/1
2 AEROSOL RESPIRATORY (INHALATION)
Qty: 0 | Refills: 0 | Status: DISCONTINUED | OUTPATIENT
Start: 2017-07-10 | End: 2017-07-11

## 2017-07-10 RX ORDER — HYDROCHLOROTHIAZIDE 25 MG
25 TABLET ORAL DAILY
Qty: 0 | Refills: 0 | Status: DISCONTINUED | OUTPATIENT
Start: 2017-07-10 | End: 2017-07-11

## 2017-07-10 RX ORDER — NICOTINE POLACRILEX 2 MG
1 GUM BUCCAL DAILY
Qty: 0 | Refills: 0 | Status: DISCONTINUED | OUTPATIENT
Start: 2017-07-10 | End: 2017-07-11

## 2017-07-10 RX ORDER — SODIUM CHLORIDE 9 MG/ML
500 INJECTION INTRAMUSCULAR; INTRAVENOUS; SUBCUTANEOUS
Qty: 0 | Refills: 0 | Status: DISCONTINUED | OUTPATIENT
Start: 2017-07-10 | End: 2017-07-11

## 2017-07-10 RX ORDER — LACTULOSE 10 G/15ML
15 SOLUTION ORAL
Qty: 0 | Refills: 0 | COMMUNITY

## 2017-07-10 RX ORDER — FLUPHENAZINE HYDROCHLORIDE 1 MG/1
1 TABLET, FILM COATED ORAL
Qty: 0 | Refills: 0 | COMMUNITY

## 2017-07-10 RX ORDER — ALBUTEROL 90 UG/1
2 AEROSOL, METERED ORAL
Qty: 0 | Refills: 0 | COMMUNITY

## 2017-07-10 RX ORDER — SIMVASTATIN 20 MG/1
40 TABLET, FILM COATED ORAL AT BEDTIME
Qty: 0 | Refills: 0 | Status: DISCONTINUED | OUTPATIENT
Start: 2017-07-10 | End: 2017-07-11

## 2017-07-10 RX ORDER — SIMVASTATIN 20 MG/1
1 TABLET, FILM COATED ORAL
Qty: 0 | Refills: 0 | COMMUNITY

## 2017-07-10 RX ORDER — FENOFIBRATE,MICRONIZED 130 MG
150 CAPSULE ORAL DAILY
Qty: 0 | Refills: 0 | Status: DISCONTINUED | OUTPATIENT
Start: 2017-07-10 | End: 2017-07-11

## 2017-07-10 RX ORDER — PANTOPRAZOLE SODIUM 20 MG/1
40 TABLET, DELAYED RELEASE ORAL
Qty: 0 | Refills: 0 | Status: DISCONTINUED | OUTPATIENT
Start: 2017-07-10 | End: 2017-07-11

## 2017-07-10 RX ORDER — PANTOPRAZOLE SODIUM 20 MG/1
1 TABLET, DELAYED RELEASE ORAL
Qty: 0 | Refills: 0 | COMMUNITY

## 2017-07-10 RX ORDER — AMLODIPINE BESYLATE 2.5 MG/1
5 TABLET ORAL DAILY
Qty: 0 | Refills: 0 | Status: DISCONTINUED | OUTPATIENT
Start: 2017-07-10 | End: 2017-07-11

## 2017-07-10 RX ADMIN — Medication 1 PATCH: at 19:47

## 2017-07-10 RX ADMIN — ALBUTEROL 1 PUFF(S): 90 AEROSOL, METERED ORAL at 22:00

## 2017-07-10 RX ADMIN — DIVALPROEX SODIUM 250 MILLIGRAM(S): 500 TABLET, DELAYED RELEASE ORAL at 23:18

## 2017-07-10 RX ADMIN — FLUPHENAZINE HYDROCHLORIDE 5 MILLIGRAM(S): 1 TABLET, FILM COATED ORAL at 23:15

## 2017-07-10 RX ADMIN — MONTELUKAST 10 MILLIGRAM(S): 4 TABLET, CHEWABLE ORAL at 23:17

## 2017-07-10 RX ADMIN — BUDESONIDE AND FORMOTEROL FUMARATE DIHYDRATE 2 PUFF(S): 160; 4.5 AEROSOL RESPIRATORY (INHALATION) at 23:16

## 2017-07-10 RX ADMIN — SIMVASTATIN 40 MILLIGRAM(S): 20 TABLET, FILM COATED ORAL at 23:17

## 2017-07-10 RX ADMIN — Medication 243 MILLIGRAM(S): at 13:33

## 2017-07-10 RX ADMIN — QUETIAPINE FUMARATE 200 MILLIGRAM(S): 200 TABLET, FILM COATED ORAL at 23:18

## 2017-07-10 RX ADMIN — QUETIAPINE FUMARATE 50 MILLIGRAM(S): 200 TABLET, FILM COATED ORAL at 23:15

## 2017-07-10 RX ADMIN — GABAPENTIN 300 MILLIGRAM(S): 400 CAPSULE ORAL at 23:17

## 2017-07-10 RX ADMIN — Medication 0.5 MILLIGRAM(S): at 19:47

## 2017-07-10 RX ADMIN — Medication 0.5 MILLIGRAM(S): at 23:17

## 2017-07-10 RX ADMIN — SODIUM CHLORIDE 75 MILLILITER(S): 9 INJECTION INTRAMUSCULAR; INTRAVENOUS; SUBCUTANEOUS at 13:33

## 2017-07-11 ENCOUNTER — TRANSCRIPTION ENCOUNTER (OUTPATIENT)
Age: 62
End: 2017-07-11

## 2017-07-11 VITALS
RESPIRATION RATE: 18 BRPM | SYSTOLIC BLOOD PRESSURE: 121 MMHG | OXYGEN SATURATION: 94 % | DIASTOLIC BLOOD PRESSURE: 63 MMHG

## 2017-07-11 LAB
ANION GAP SERPL CALC-SCNC: 13 MMOL/L — SIGNIFICANT CHANGE UP (ref 5–17)
BUN SERPL-MCNC: 12 MG/DL — SIGNIFICANT CHANGE UP (ref 7–23)
CALCIUM SERPL-MCNC: 9.6 MG/DL — SIGNIFICANT CHANGE UP (ref 8.4–10.5)
CHLORIDE SERPL-SCNC: 92 MMOL/L — LOW (ref 96–108)
CO2 SERPL-SCNC: 26 MMOL/L — SIGNIFICANT CHANGE UP (ref 22–31)
CREAT SERPL-MCNC: 0.7 MG/DL — SIGNIFICANT CHANGE UP (ref 0.5–1.3)
GLUCOSE SERPL-MCNC: 128 MG/DL — HIGH (ref 70–99)
HCT VFR BLD CALC: 35.9 % — SIGNIFICANT CHANGE UP (ref 34.5–45)
HGB BLD-MCNC: 11.8 G/DL — SIGNIFICANT CHANGE UP (ref 11.5–15.5)
MAGNESIUM SERPL-MCNC: 1.7 MG/DL — SIGNIFICANT CHANGE UP (ref 1.6–2.6)
MCHC RBC-ENTMCNC: 28.7 PG — SIGNIFICANT CHANGE UP (ref 27–34)
MCHC RBC-ENTMCNC: 32.9 G/DL — SIGNIFICANT CHANGE UP (ref 32–36)
MCV RBC AUTO: 87.3 FL — SIGNIFICANT CHANGE UP (ref 80–100)
PLATELET # BLD AUTO: 353 K/UL — SIGNIFICANT CHANGE UP (ref 150–400)
POTASSIUM SERPL-MCNC: 4.1 MMOL/L — SIGNIFICANT CHANGE UP (ref 3.5–5.3)
POTASSIUM SERPL-SCNC: 4.1 MMOL/L — SIGNIFICANT CHANGE UP (ref 3.5–5.3)
RBC # BLD: 4.11 M/UL — SIGNIFICANT CHANGE UP (ref 3.8–5.2)
RBC # FLD: 14.8 % — SIGNIFICANT CHANGE UP (ref 10.3–16.9)
SODIUM SERPL-SCNC: 131 MMOL/L — LOW (ref 135–145)
WBC # BLD: 7.1 K/UL — SIGNIFICANT CHANGE UP (ref 3.8–10.5)
WBC # FLD AUTO: 7.1 K/UL — SIGNIFICANT CHANGE UP (ref 3.8–10.5)

## 2017-07-11 PROCEDURE — 93010 ELECTROCARDIOGRAM REPORT: CPT

## 2017-07-11 PROCEDURE — 99239 HOSP IP/OBS DSCHRG MGMT >30: CPT

## 2017-07-11 RX ORDER — MAGNESIUM OXIDE 400 MG ORAL TABLET 241.3 MG
800 TABLET ORAL ONCE
Qty: 0 | Refills: 0 | Status: COMPLETED | OUTPATIENT
Start: 2017-07-11 | End: 2017-07-11

## 2017-07-11 RX ORDER — HALOPERIDOL DECANOATE 100 MG/ML
0.5 INJECTION INTRAMUSCULAR ONCE
Qty: 0 | Refills: 0 | Status: DISCONTINUED | OUTPATIENT
Start: 2017-07-11 | End: 2017-07-11

## 2017-07-11 RX ORDER — ASPIRIN/CALCIUM CARB/MAGNESIUM 324 MG
1 TABLET ORAL
Qty: 30 | Refills: 11 | OUTPATIENT
Start: 2017-07-11 | End: 2018-07-05

## 2017-07-11 RX ORDER — FENOFIBRATE,MICRONIZED 130 MG
145 CAPSULE ORAL DAILY
Qty: 0 | Refills: 0 | Status: DISCONTINUED | OUTPATIENT
Start: 2017-07-11 | End: 2017-07-11

## 2017-07-11 RX ORDER — METFORMIN HYDROCHLORIDE 850 MG/1
1 TABLET ORAL
Qty: 0 | Refills: 0 | COMMUNITY

## 2017-07-11 RX ORDER — SENNA PLUS 8.6 MG/1
2 TABLET ORAL AT BEDTIME
Qty: 0 | Refills: 0 | Status: DISCONTINUED | OUTPATIENT
Start: 2017-07-11 | End: 2017-07-11

## 2017-07-11 RX ORDER — CLOPIDOGREL BISULFATE 75 MG/1
1 TABLET, FILM COATED ORAL
Qty: 30 | Refills: 11 | OUTPATIENT
Start: 2017-07-11 | End: 2018-07-05

## 2017-07-11 RX ORDER — IPRATROPIUM/ALBUTEROL SULFATE 18-103MCG
3 AEROSOL WITH ADAPTER (GRAM) INHALATION ONCE
Qty: 0 | Refills: 0 | Status: COMPLETED | OUTPATIENT
Start: 2017-07-11 | End: 2017-07-11

## 2017-07-11 RX ADMIN — Medication 0.5 MILLIGRAM(S): at 06:15

## 2017-07-11 RX ADMIN — Medication 0.5 MILLIGRAM(S): at 13:04

## 2017-07-11 RX ADMIN — MAGNESIUM OXIDE 400 MG ORAL TABLET 800 MILLIGRAM(S): 241.3 TABLET ORAL at 10:35

## 2017-07-11 RX ADMIN — Medication 100 MILLIGRAM(S): at 06:14

## 2017-07-11 RX ADMIN — Medication 3 MILLILITER(S): at 09:39

## 2017-07-11 RX ADMIN — MONTELUKAST 10 MILLIGRAM(S): 4 TABLET, CHEWABLE ORAL at 13:03

## 2017-07-11 RX ADMIN — Medication 145 MILLIGRAM(S): at 13:03

## 2017-07-11 RX ADMIN — Medication 0.5 MILLIGRAM(S): at 07:34

## 2017-07-11 RX ADMIN — Medication 1 PATCH: at 13:03

## 2017-07-11 RX ADMIN — ALBUTEROL 1 PUFF(S): 90 AEROSOL, METERED ORAL at 06:16

## 2017-07-11 RX ADMIN — CLOPIDOGREL BISULFATE 75 MILLIGRAM(S): 75 TABLET, FILM COATED ORAL at 13:04

## 2017-07-11 RX ADMIN — Medication 81 MILLIGRAM(S): at 13:04

## 2017-07-11 RX ADMIN — GABAPENTIN 300 MILLIGRAM(S): 400 CAPSULE ORAL at 13:14

## 2017-07-11 RX ADMIN — FLUPHENAZINE HYDROCHLORIDE 5 MILLIGRAM(S): 1 TABLET, FILM COATED ORAL at 07:34

## 2017-07-11 RX ADMIN — AMLODIPINE BESYLATE 5 MILLIGRAM(S): 2.5 TABLET ORAL at 06:15

## 2017-07-11 RX ADMIN — QUETIAPINE FUMARATE 50 MILLIGRAM(S): 200 TABLET, FILM COATED ORAL at 10:34

## 2017-07-11 RX ADMIN — BUDESONIDE AND FORMOTEROL FUMARATE DIHYDRATE 2 PUFF(S): 160; 4.5 AEROSOL RESPIRATORY (INHALATION) at 06:14

## 2017-07-11 RX ADMIN — Medication 25 MILLIGRAM(S): at 06:14

## 2017-07-11 RX ADMIN — GABAPENTIN 300 MILLIGRAM(S): 400 CAPSULE ORAL at 06:15

## 2017-07-11 RX ADMIN — PANTOPRAZOLE SODIUM 40 MILLIGRAM(S): 20 TABLET, DELAYED RELEASE ORAL at 06:15

## 2017-07-11 RX ADMIN — DIVALPROEX SODIUM 250 MILLIGRAM(S): 500 TABLET, DELAYED RELEASE ORAL at 13:04

## 2017-07-11 RX ADMIN — SERTRALINE 50 MILLIGRAM(S): 25 TABLET, FILM COATED ORAL at 13:03

## 2017-07-11 NOTE — DISCHARGE NOTE ADULT - CARE PLAN
Principal Discharge DX:	PVD (peripheral vascular disease)  Goal:	continue medications, follow up  Instructions for follow-up, activity and diet:	You underwent a peripheral angiogram and received a stent to both common iliac arteries. PLEASE CONTINUE ASPIRIN 81MG DAILY AND PLAVIX 75MG DAILY. DO NOT STOP THESE MEDICATIONS FOR ANY REASON AS THEY ARE KEEPING YOUR STENT OPEN AND PREVENTING A HEART ATTACK. Avoid strenuous activity or heavy lifting for the next five days. Do not take a bath or swim for the next five days; you may shower. For any bleeding or hematoma formation (hardened blood collection under the skin) at the access site of right and left groins please hold pressure and go to the emergency room. Please follow up with Dr. Yañez in 1-2 weeks.  Secondary Diagnosis:	Diabetes mellitus  Goal:	restart metformin on 7/13  Instructions for follow-up, activity and diet:	Please continue medications as above for diabetes. Maintain a low carbohydrate diet. Check your blood sugar regularly. For blood sugar that is too high or too low please call your doctor or go to the emergency room as necessary.  Secondary Diagnosis:	COPD (chronic obstructive pulmonary disease)  Goal:	continue inhalers  Instructions for follow-up, activity and diet:	Continue inhalers as prescribed for lung disease. Please stop smoking as this is a large contributing factor to your peripheral vascular disease as well as worsening your lung function.  Secondary Diagnosis:	Hypertension  Goal:	continue medications  Instructions for follow-up, activity and diet:	Please continue medications as listed to keep your blood pressure controlled. For blood pressure that is too high or too low please see your doctor or go to the emergency room as necessary.  Secondary Diagnosis:	HLD (hyperlipidemia)  Goal:	continue medications  Instructions for follow-up, activity and diet:	Please continue simvastatin and fenofibrate to keep your cholesterol low. High cholesterol contributes to heart disease.

## 2017-07-11 NOTE — DISCHARGE NOTE ADULT - PATIENT PORTAL LINK FT
“You can access the Follow[facility] Patient Portal, offered by [facility], by registering with the following website: [email or web address]”

## 2017-07-11 NOTE — DISCHARGE NOTE ADULT - CARE PROVIDER_API CALL
Damian Yañez), Cardiovascular Disease; Internal Medicine; Interventional Cardiology  [address]  [place] [address]  Phone: [phone]  Fax: [phone]

## 2017-07-11 NOTE — DISCHARGE NOTE ADULT - HOSPITAL COURSE
61 y.o female, POOR HISTORIAN, Current heavy smoker (1.5 PPD x 50 years) with a PMHx of Bipolar disorder, schizoaffective disorder, panic attacks and depression, known non-obstructive CAD, COPD (prior hospitalizations most recently 2017, denies intubations), HTN, Hyperlipidemia, fibromyalgia, DMII, TIA 2017 ( no residual deficits), PVD s/p diagnostic peripheral angiogram @ [facility] on 17 revealing 70% RCIA  stenosis, who returns for stage PTA/stent. She continues to c/o of his initial presenting symptoms of bilateral leg claudication after walking one city block (R>L) relieved with rest x several years. Pt explains she experiences calf cramping and sharp shooting pain in lower back/buttocks region with radiation to upper thighs when ambulating. In recent months, her feet are cool to touch but denies skin breakdown, leg edema.  Arterial US (2017): Right: moderate arterial insufficiency due to infrapopliteal disease.  Left: No arterial Insufficiency. PVR on right le.82, PVR on left: 1.1. In light of patient’s risk factors, presenting symptoms and Arterial US/PVR findings, pt now presents for peripheral angiogram with possible intervention if clinically indicated. In light of pt's risk factors, Known PVD, continuing above disabling claudicating symptoms, pt returned for stage PTA/stent of known REYNA lesion. Patient underwent peripheral angiogram on 7/10/17 and received left and right common iliac stents, right and left groin both perclosed. Patient was seen and examined this AM and was complaining of SOB. Patient took home inhalers but still had wheezes on exam for which a duoneb was given with improvement. O2 sat 95% RA. Patient's VSS, labs wnl and no events overnight on telemetry. On exam, left groin stable without hematoma or ooze. Right groin hematoma 4cm x 2cm noted on exam compressed out with 10minutes manual pressure. Hgb/Hct stable 11.8/35.9 today. Patient ambulated and groin is stable on re-exam without hematoma. Patient is stable for discharge per Dr. Navarro. Patient has been given appropriate discharge instructions including medication regimen, access site management and follow up.    Temp 96.8F, HR 92bpm, /66, RR 18, O2 sat 92% RA  Gen: NAD, A&O x 3  Cards: RRR, clear S1 and S2 without murmur  Pulm: Wheezes throughout, improved with duoneb  Abd: soft, NT  Right groin: Ecchymotic, 2+ femoral pulse without bruit, no hematoma or ooze  Left groin: 2+ femoral pulse without bruit, no hematoma or ooze  Ext: No LE edema b/l, DP/PT pulses 1+ b/l

## 2017-07-11 NOTE — DISCHARGE NOTE ADULT - MEDICATION SUMMARY - MEDICATIONS TO TAKE
I will START or STAY ON the medications listed below when I get home from the hospital:    Aspirin Enteric Coated 81 mg oral delayed release tablet  -- 1 tab(s) by mouth once a day  -- Swallow whole.  Do not crush.  Take with food or milk.    -- Indication: For Peripheral vascular disease, keeps stent open    Depakote  mg oral tablet, extended release  -- 3 tab(s) by mouth once a day  -- Indication: For As prescribed    KlonoPIN 0.5 mg oral tablet  -- 1 tab(s) by mouth 4 times a day  -- Indication: For As needed for anxiety    Neurontin 300 mg oral capsule  -- 1 cap(s) by mouth 3 times a day  -- Indication: For Prevents peripheral nerve pain    clomiPRAMINE 75 mg oral capsule  -- 1 cap(s) by mouth once a day (at bedtime)  -- Indication: For As prescribed    Zoloft 50 mg oral tablet  -- 1 tab(s) by mouth once a day  -- Indication: For As prescribed    metFORMIN 850 mg oral tablet  -- 1 tab(s) by mouth once a day (in the morning), RESTART THURSDAY 7/13  -- Indication: For Diabetes mellitus    fenofibrate 160 mg oral tablet  -- 1 tab(s) by mouth once a day  -- Indication: For high cholesterol    simvastatin 40 mg oral tablet  -- 1 tab(s) by mouth once a day (at bedtime)  -- Indication: For high cholesterol    benztropine 0.5 mg oral tablet  -- 1 tab(s) by mouth 2 times a day  -- Indication: For As prescribed    clopidogrel 75 mg oral tablet  -- 1 tab(s) by mouth once a day  -- Do not take aspirin or aspirin containing products without knowledge and consent of your physician.    -- Indication: For Peripheral vascular disease, keeps stent open    SEROquel 50 mg oral tablet  -- 1 tab(s) by mouth 2 times a day  -- Indication: For As prescribed    fluPHENAZine 5 mg oral tablet  -- 1 tab(s) by mouth 2 times a day  -- Indication: For As prescribed    SEROquel 200 mg oral tablet  -- 1 tab(s) by mouth once a day (at bedtime)  -- Indication: For As prescribed    Toprol- mg oral tablet, extended release  -- 1 tab(s) by mouth once a day  -- Indication: For high blood pressure, protects heart    Ventolin HFA 90 mcg/inh inhalation aerosol  -- 2 puff(s) inhaled 4 times a day  -- Indication: For COPD    Dulera 100 mcg-5 mcg/inh inhalation aerosol  -- 2 puff(s) inhaled 2 times a day  -- Indication: For COPD    Norvasc 5 mg oral tablet  -- 1 tab(s) by mouth once a day  -- Indication: For high blood pressure    hydroCHLOROthiazide 25 mg oral tablet  -- 1 tab(s) by mouth once a day  -- Indication: For high blood pressure, prevents fluid retneion    lactulose 10 g/15 mL oral syrup  -- 15 milliliter(s) by mouth once a day, As Needed  -- Indication: For As needed for constipation    Singulair 10 mg oral tablet  -- 1 tab(s) by mouth once a day  -- Indication: For COPD    pantoprazole 40 mg oral delayed release tablet  -- 1 tab(s) by mouth once a day  -- Indication: For Prevents gastric reflux    Nicoderm C-Q 21 mg/24 hr transdermal film, extended release  -- 1 patch by transdermal patch once a day  -- Indication: For Smoking cessation    Nicorette 4 mg oral transmucosal gum  -- 1 gum oral transmucosal every hour, As Needed  -- Indication: For Smoking cessation    B Complex 50  -- 1 tab(s) by mouth once a day  -- Indication: For Supplement    ergocalciferol 50,000 intl units (1.25 mg) oral capsule  -- 1 cap(s) by mouth once a week  -- Indication: For Supplement

## 2017-07-11 NOTE — DISCHARGE NOTE ADULT - PLAN OF CARE
continue medications, follow up You underwent a peripheral angiogram and received a stent to both common iliac arteries. PLEASE CONTINUE ASPIRIN 81MG DAILY AND PLAVIX 75MG DAILY. DO NOT STOP THESE MEDICATIONS FOR ANY REASON AS THEY ARE KEEPING YOUR STENT OPEN AND PREVENTING A HEART ATTACK. Avoid strenuous activity or heavy lifting for the next five days. Do not take a bath or swim for the next five days; you may shower. For any bleeding or hematoma formation (hardened blood collection under the skin) at the access site of right and left groins please hold pressure and go to the emergency room. Please follow up with Dr. Yañez in 1-2 weeks. restart metformin on 7/13 Please continue medications as above for diabetes. Maintain a low carbohydrate diet. Check your blood sugar regularly. For blood sugar that is too high or too low please call your doctor or go to the emergency room as necessary. continue inhalers Continue inhalers as prescribed for lung disease. Please stop smoking as this is a large contributing factor to your peripheral vascular disease as well as worsening your lung function. continue medications Please continue medications as listed to keep your blood pressure controlled. For blood pressure that is too high or too low please see your doctor or go to the emergency room as necessary. Please continue simvastatin and fenofibrate to keep your cholesterol low. High cholesterol contributes to heart disease.

## 2017-07-13 DIAGNOSIS — I10 ESSENTIAL (PRIMARY) HYPERTENSION: ICD-10-CM

## 2017-07-13 DIAGNOSIS — E11.9 TYPE 2 DIABETES MELLITUS WITHOUT COMPLICATIONS: ICD-10-CM

## 2017-07-13 DIAGNOSIS — Z79.82 LONG TERM (CURRENT) USE OF ASPIRIN: ICD-10-CM
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
## 2017-07-13 DIAGNOSIS — I45.10 UNSPECIFIED RIGHT BUNDLE-BRANCH BLOCK: ICD-10-CM

## 2017-07-13 DIAGNOSIS — E66.9 OBESITY, UNSPECIFIED: ICD-10-CM

## 2017-07-13 DIAGNOSIS — I77.1 STRICTURE OF ARTERY: ICD-10-CM

## 2017-07-13 DIAGNOSIS — Z79.02 LONG TERM (CURRENT) USE OF ANTITHROMBOTICS/ANTIPLATELETS: ICD-10-CM

## 2017-07-13 DIAGNOSIS — M79.7 FIBROMYALGIA: ICD-10-CM

## 2017-07-13 DIAGNOSIS — F41.9 ANXIETY DISORDER, UNSPECIFIED: ICD-10-CM
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
## 2017-07-13 DIAGNOSIS — Z88.8 ALLERGY STATUS TO OTHER DRUGS, MEDICAMENTS AND BIOLOGICAL SUBSTANCES STATUS: ICD-10-CM

## 2017-07-13 DIAGNOSIS — I73.89 OTHER SPECIFIED PERIPHERAL VASCULAR DISEASES: ICD-10-CM

## 2017-07-13 DIAGNOSIS — I25.10 ATHEROSCLEROTIC HEART DISEASE OF NATIVE CORONARY ARTERY WITHOUT ANGINA PECTORIS: ICD-10-CM

## 2017-07-13 DIAGNOSIS — F31.9 BIPOLAR DISORDER, UNSPECIFIED: ICD-10-CM

## 2017-07-13 DIAGNOSIS — L76.32 POSTPROCEDURAL HEMATOMA OF SKIN AND SUBCUTANEOUS TISSUE FOLLOWING OTHER PROCEDURE: ICD-10-CM

## 2017-07-13 DIAGNOSIS — Z88.2 ALLERGY STATUS TO SULFONAMIDES: ICD-10-CM

## 2017-07-13 DIAGNOSIS — Z79.899 OTHER LONG TERM (CURRENT) DRUG THERAPY: ICD-10-CM

## 2017-07-13 DIAGNOSIS — Z90.722 ACQUIRED ABSENCE OF OVARIES, BILATERAL: ICD-10-CM

## 2017-07-13 DIAGNOSIS — I70.218 ATHEROSCLEROSIS OF NATIVE ARTERIES OF EXTREMITIES WITH INTERMITTENT CLAUDICATION, OTHER EXTREMITY: ICD-10-CM

## 2017-07-13 DIAGNOSIS — Z90.79 ACQUIRED ABSENCE OF OTHER GENITAL ORGAN(S): ICD-10-CM

## 2017-07-13 DIAGNOSIS — F25.9 SCHIZOAFFECTIVE DISORDER, UNSPECIFIED: ICD-10-CM

## 2017-07-13 DIAGNOSIS — Z86.73 PERSONAL HISTORY OF TRANSIENT ISCHEMIC ATTACK (TIA), AND CEREBRAL INFARCTION WITHOUT RESIDUAL DEFICITS: ICD-10-CM

## 2017-07-13 DIAGNOSIS — I73.9 PERIPHERAL VASCULAR DISEASE, UNSPECIFIED: ICD-10-CM

## 2017-07-13 DIAGNOSIS — Z88.1 ALLERGY STATUS TO OTHER ANTIBIOTIC AGENTS STATUS: ICD-10-CM

## 2017-07-13 DIAGNOSIS — E78.5 HYPERLIPIDEMIA, UNSPECIFIED: ICD-10-CM

## 2017-07-13 DIAGNOSIS — J43.9 EMPHYSEMA, UNSPECIFIED: ICD-10-CM

## 2017-07-13 DIAGNOSIS — K21.9 GASTRO-ESOPHAGEAL REFLUX DISEASE WITHOUT ESOPHAGITIS: ICD-10-CM

## 2017-07-13 DIAGNOSIS — F17.210 NICOTINE DEPENDENCE, CIGARETTES, UNCOMPLICATED: ICD-10-CM

## 2017-12-15 PROCEDURE — C1769: CPT

## 2017-12-15 PROCEDURE — 85610 PROTHROMBIN TIME: CPT

## 2017-12-15 PROCEDURE — 85027 COMPLETE CBC AUTOMATED: CPT

## 2017-12-15 PROCEDURE — 80061 LIPID PANEL: CPT

## 2017-12-15 PROCEDURE — 83036 HEMOGLOBIN GLYCOSYLATED A1C: CPT

## 2017-12-15 PROCEDURE — 94640 AIRWAY INHALATION TREATMENT: CPT

## 2017-12-15 PROCEDURE — 83735 ASSAY OF MAGNESIUM: CPT

## 2017-12-15 PROCEDURE — C1887: CPT

## 2017-12-15 PROCEDURE — C1760: CPT

## 2017-12-15 PROCEDURE — 80048 BASIC METABOLIC PNL TOTAL CA: CPT

## 2017-12-15 PROCEDURE — C1894: CPT

## 2017-12-15 PROCEDURE — C1725: CPT

## 2017-12-15 PROCEDURE — 36415 COLL VENOUS BLD VENIPUNCTURE: CPT

## 2017-12-15 PROCEDURE — C1889: CPT

## 2017-12-15 PROCEDURE — 85730 THROMBOPLASTIN TIME PARTIAL: CPT

## 2017-12-15 PROCEDURE — 85025 COMPLETE CBC W/AUTO DIFF WBC: CPT

## 2017-12-15 PROCEDURE — 93005 ELECTROCARDIOGRAM TRACING: CPT

## 2017-12-15 PROCEDURE — C1874: CPT

## 2017-12-15 PROCEDURE — 80053 COMPREHEN METABOLIC PANEL: CPT

## 2018-02-28 ENCOUNTER — INPATIENT (INPATIENT)
Facility: HOSPITAL | Age: 63
LOS: 2 days | DRG: 871 | End: 2018-03-03
Attending: INTERNAL MEDICINE | Admitting: INTERNAL MEDICINE
Payer: COMMERCIAL

## 2018-02-28 VITALS
HEART RATE: 128 BPM | DIASTOLIC BLOOD PRESSURE: 40 MMHG | TEMPERATURE: 105 F | RESPIRATION RATE: 23 BRPM | OXYGEN SATURATION: 98 % | SYSTOLIC BLOOD PRESSURE: 72 MMHG

## 2018-02-28 DIAGNOSIS — I63.9 CEREBRAL INFARCTION, UNSPECIFIED: ICD-10-CM

## 2018-02-28 DIAGNOSIS — Z90.89 ACQUIRED ABSENCE OF OTHER ORGANS: Chronic | ICD-10-CM

## 2018-02-28 DIAGNOSIS — N83.201 UNSPECIFIED OVARIAN CYST, RIGHT SIDE: Chronic | ICD-10-CM

## 2018-02-28 DIAGNOSIS — J33.8 OTHER POLYP OF SINUS: Chronic | ICD-10-CM

## 2018-02-28 PROBLEM — F41.9 ANXIETY DISORDER, UNSPECIFIED: Chronic | Status: ACTIVE | Noted: 2017-06-23

## 2018-02-28 PROBLEM — E11.9 TYPE 2 DIABETES MELLITUS WITHOUT COMPLICATIONS: Chronic | Status: ACTIVE | Noted: 2017-06-23

## 2018-02-28 PROBLEM — M79.7 FIBROMYALGIA: Chronic | Status: ACTIVE | Noted: 2017-06-23

## 2018-02-28 PROBLEM — F25.9 SCHIZOAFFECTIVE DISORDER, UNSPECIFIED: Chronic | Status: ACTIVE | Noted: 2017-06-23

## 2018-02-28 PROBLEM — F32.9 MAJOR DEPRESSIVE DISORDER, SINGLE EPISODE, UNSPECIFIED: Chronic | Status: ACTIVE | Noted: 2017-06-23

## 2018-02-28 LAB
ALBUMIN SERPL ELPH-MCNC: 3 G/DL — LOW (ref 3.3–5)
ALBUMIN SERPL ELPH-MCNC: 3 G/DL — LOW (ref 3.3–5)
ALBUMIN SERPL ELPH-MCNC: 3.2 G/DL — LOW (ref 3.3–5)
ALBUMIN SERPL ELPH-MCNC: 3.3 G/DL — SIGNIFICANT CHANGE UP (ref 3.3–5)
ALP SERPL-CCNC: 65 U/L — SIGNIFICANT CHANGE UP (ref 40–120)
ALP SERPL-CCNC: 67 U/L — SIGNIFICANT CHANGE UP (ref 40–120)
ALP SERPL-CCNC: 70 U/L — SIGNIFICANT CHANGE UP (ref 40–120)
ALP SERPL-CCNC: 74 U/L — SIGNIFICANT CHANGE UP (ref 40–120)
ALT FLD-CCNC: 354 U/L — HIGH (ref 10–45)
ALT FLD-CCNC: 428 U/L — HIGH (ref 10–45)
ALT FLD-CCNC: 490 U/L — HIGH (ref 10–45)
ALT FLD-CCNC: 533 U/L — HIGH (ref 10–45)
ANION GAP SERPL CALC-SCNC: 17 MMOL/L — SIGNIFICANT CHANGE UP (ref 5–17)
ANION GAP SERPL CALC-SCNC: 17 MMOL/L — SIGNIFICANT CHANGE UP (ref 5–17)
ANION GAP SERPL CALC-SCNC: 21 MMOL/L — HIGH (ref 5–17)
ANION GAP SERPL CALC-SCNC: 26 MMOL/L — HIGH (ref 5–17)
APPEARANCE UR: CLEAR — SIGNIFICANT CHANGE UP
APPEARANCE UR: CLEAR — SIGNIFICANT CHANGE UP
APTT BLD: 24.8 SEC — LOW (ref 27.5–37.4)
APTT BLD: 25.7 SEC — LOW (ref 27.5–37.4)
APTT BLD: 28.2 SEC — SIGNIFICANT CHANGE UP (ref 27.5–37.4)
APTT BLD: 30.2 SEC — SIGNIFICANT CHANGE UP (ref 27.5–37.4)
AST SERPL-CCNC: 463 U/L — HIGH (ref 10–40)
AST SERPL-CCNC: 536 U/L — HIGH (ref 10–40)
AST SERPL-CCNC: 547 U/L — HIGH (ref 10–40)
AST SERPL-CCNC: 577 U/L — HIGH (ref 10–40)
BASE EXCESS BLDA CALC-SCNC: -0.6 MMOL/L — SIGNIFICANT CHANGE UP (ref -2–3)
BASE EXCESS BLDA CALC-SCNC: -1.6 MMOL/L — SIGNIFICANT CHANGE UP (ref -2–3)
BASE EXCESS BLDA CALC-SCNC: -4.1 MMOL/L — LOW (ref -2–3)
BASE EXCESS BLDA CALC-SCNC: -5.4 MMOL/L — LOW (ref -2–3)
BASOPHILS NFR BLD AUTO: SIGNIFICANT CHANGE UP % (ref 0–2)
BILIRUB SERPL-MCNC: 0.7 MG/DL — SIGNIFICANT CHANGE UP (ref 0.2–1.2)
BILIRUB SERPL-MCNC: 0.7 MG/DL — SIGNIFICANT CHANGE UP (ref 0.2–1.2)
BILIRUB SERPL-MCNC: 0.8 MG/DL — SIGNIFICANT CHANGE UP (ref 0.2–1.2)
BILIRUB SERPL-MCNC: 0.9 MG/DL — SIGNIFICANT CHANGE UP (ref 0.2–1.2)
BILIRUB UR-MCNC: NEGATIVE — SIGNIFICANT CHANGE UP
BILIRUB UR-MCNC: NEGATIVE — SIGNIFICANT CHANGE UP
BLD GP AB SCN SERPL QL: NEGATIVE — SIGNIFICANT CHANGE UP
BUN SERPL-MCNC: 56 MG/DL — HIGH (ref 7–23)
BUN SERPL-MCNC: 60 MG/DL — HIGH (ref 7–23)
BUN SERPL-MCNC: 63 MG/DL — HIGH (ref 7–23)
BUN SERPL-MCNC: 64 MG/DL — HIGH (ref 7–23)
CALCIUM SERPL-MCNC: 7.8 MG/DL — LOW (ref 8.4–10.5)
CALCIUM SERPL-MCNC: 7.9 MG/DL — LOW (ref 8.4–10.5)
CALCIUM SERPL-MCNC: 8.5 MG/DL — SIGNIFICANT CHANGE UP (ref 8.4–10.5)
CALCIUM SERPL-MCNC: 9.3 MG/DL — SIGNIFICANT CHANGE UP (ref 8.4–10.5)
CALCIUM SERPL-MCNC: 9.5 MG/DL — SIGNIFICANT CHANGE UP (ref 8.4–10.5)
CALCIUM UR-MCNC: 1.6 MG/DL — SIGNIFICANT CHANGE UP
CHLORIDE SERPL-SCNC: 103 MMOL/L — SIGNIFICANT CHANGE UP (ref 96–108)
CHLORIDE SERPL-SCNC: 106 MMOL/L — SIGNIFICANT CHANGE UP (ref 96–108)
CHLORIDE SERPL-SCNC: 111 MMOL/L — HIGH (ref 96–108)
CHLORIDE SERPL-SCNC: 112 MMOL/L — HIGH (ref 96–108)
CHLORIDE UR-SCNC: <20 MMOL/L — SIGNIFICANT CHANGE UP
CHOLEST SERPL-MCNC: 154 MG/DL — SIGNIFICANT CHANGE UP (ref 10–199)
CK MB CFR SERPL CALC: 3 NG/ML — SIGNIFICANT CHANGE UP (ref 0–6.7)
CK SERPL-CCNC: 443 U/L — HIGH (ref 25–170)
CO2 SERPL-SCNC: 18 MMOL/L — LOW (ref 22–31)
CO2 SERPL-SCNC: 18 MMOL/L — LOW (ref 22–31)
CO2 SERPL-SCNC: 20 MMOL/L — LOW (ref 22–31)
CO2 SERPL-SCNC: 21 MMOL/L — LOW (ref 22–31)
COLOR SPEC: YELLOW — SIGNIFICANT CHANGE UP
COLOR SPEC: YELLOW — SIGNIFICANT CHANGE UP
CREAT ?TM UR-MCNC: 121 MG/DL — SIGNIFICANT CHANGE UP
CREAT SERPL-MCNC: 2.43 MG/DL — HIGH (ref 0.5–1.3)
CREAT SERPL-MCNC: 2.73 MG/DL — HIGH (ref 0.5–1.3)
CREAT SERPL-MCNC: 2.95 MG/DL — HIGH (ref 0.5–1.3)
CREAT SERPL-MCNC: 3.38 MG/DL — HIGH (ref 0.5–1.3)
DIFF PNL FLD: (no result)
DIFF PNL FLD: (no result)
EOSINOPHIL NFR BLD AUTO: SIGNIFICANT CHANGE UP % (ref 0–6)
GAS PNL BLDA: SIGNIFICANT CHANGE UP
GAS PNL BLDV: SIGNIFICANT CHANGE UP
GLUCOSE BLDC GLUCOMTR-MCNC: 110 MG/DL — HIGH (ref 70–99)
GLUCOSE BLDC GLUCOMTR-MCNC: 126 MG/DL — HIGH (ref 70–99)
GLUCOSE BLDC GLUCOMTR-MCNC: 127 MG/DL — HIGH (ref 70–99)
GLUCOSE BLDC GLUCOMTR-MCNC: 156 MG/DL — HIGH (ref 70–99)
GLUCOSE BLDC GLUCOMTR-MCNC: 165 MG/DL — HIGH (ref 70–99)
GLUCOSE BLDC GLUCOMTR-MCNC: 173 MG/DL — HIGH (ref 70–99)
GLUCOSE BLDC GLUCOMTR-MCNC: 177 MG/DL — HIGH (ref 70–99)
GLUCOSE BLDC GLUCOMTR-MCNC: 181 MG/DL — HIGH (ref 70–99)
GLUCOSE BLDC GLUCOMTR-MCNC: 185 MG/DL — HIGH (ref 70–99)
GLUCOSE BLDC GLUCOMTR-MCNC: 203 MG/DL — HIGH (ref 70–99)
GLUCOSE BLDC GLUCOMTR-MCNC: 216 MG/DL — HIGH (ref 70–99)
GLUCOSE BLDC GLUCOMTR-MCNC: 280 MG/DL — HIGH (ref 70–99)
GLUCOSE BLDC GLUCOMTR-MCNC: 96 MG/DL — SIGNIFICANT CHANGE UP (ref 70–99)
GLUCOSE SERPL-MCNC: 141 MG/DL — HIGH (ref 70–99)
GLUCOSE SERPL-MCNC: 240 MG/DL — HIGH (ref 70–99)
GLUCOSE SERPL-MCNC: 243 MG/DL — HIGH (ref 70–99)
GLUCOSE SERPL-MCNC: 389 MG/DL — HIGH (ref 70–99)
GLUCOSE UR QL: NEGATIVE — SIGNIFICANT CHANGE UP
GLUCOSE UR QL: NEGATIVE — SIGNIFICANT CHANGE UP
HBA1C BLD-MCNC: 6.4 % — HIGH (ref 4–5.6)
HCO3 BLDA-SCNC: 19 MMOL/L — LOW (ref 21–28)
HCO3 BLDA-SCNC: 19 MMOL/L — LOW (ref 21–28)
HCO3 BLDA-SCNC: 22 MMOL/L — SIGNIFICANT CHANGE UP (ref 21–28)
HCO3 BLDA-SCNC: 23 MMOL/L — SIGNIFICANT CHANGE UP (ref 21–28)
HCT VFR BLD CALC: 30.9 % — LOW (ref 34.5–45)
HCT VFR BLD CALC: 34.8 % — SIGNIFICANT CHANGE UP (ref 34.5–45)
HCT VFR BLD CALC: 36.6 % — SIGNIFICANT CHANGE UP (ref 34.5–45)
HCT VFR BLD CALC: 42.9 % — SIGNIFICANT CHANGE UP (ref 34.5–45)
HDLC SERPL-MCNC: 24 MG/DL — LOW (ref 40–125)
HGB BLD-MCNC: 10.1 G/DL — LOW (ref 11.5–15.5)
HGB BLD-MCNC: 11.3 G/DL — LOW (ref 11.5–15.5)
HGB BLD-MCNC: 11.6 G/DL — SIGNIFICANT CHANGE UP (ref 11.5–15.5)
HGB BLD-MCNC: 13.7 G/DL — SIGNIFICANT CHANGE UP (ref 11.5–15.5)
INR BLD: 1.29 — HIGH (ref 0.88–1.16)
INR BLD: 1.3 — HIGH (ref 0.88–1.16)
INR BLD: 1.33 — HIGH (ref 0.88–1.16)
INR BLD: 1.42 — HIGH (ref 0.88–1.16)
KETONES UR-MCNC: NEGATIVE — SIGNIFICANT CHANGE UP
KETONES UR-MCNC: NEGATIVE — SIGNIFICANT CHANGE UP
LACTATE SERPL-SCNC: 1.7 MMOL/L — SIGNIFICANT CHANGE UP (ref 0.5–2)
LACTATE SERPL-SCNC: 2.1 MMOL/L — HIGH (ref 0.5–2)
LACTATE SERPL-SCNC: 4.7 MMOL/L — CRITICAL HIGH (ref 0.5–2)
LEUKOCYTE ESTERASE UR-ACNC: (no result)
LEUKOCYTE ESTERASE UR-ACNC: NEGATIVE — SIGNIFICANT CHANGE UP
LIPID PNL WITH DIRECT LDL SERPL: 104 MG/DL — SIGNIFICANT CHANGE UP
LYMPHOCYTES # BLD AUTO: 16 % — SIGNIFICANT CHANGE UP (ref 13–44)
LYMPHOCYTES # BLD AUTO: SIGNIFICANT CHANGE UP % (ref 13–44)
MAGNESIUM SERPL-MCNC: 2.3 MG/DL — SIGNIFICANT CHANGE UP (ref 1.6–2.6)
MAGNESIUM SERPL-MCNC: 2.8 MG/DL — HIGH (ref 1.6–2.6)
MCHC RBC-ENTMCNC: 28.6 PG — SIGNIFICANT CHANGE UP (ref 27–34)
MCHC RBC-ENTMCNC: 29.2 PG — SIGNIFICANT CHANGE UP (ref 27–34)
MCHC RBC-ENTMCNC: 29.4 PG — SIGNIFICANT CHANGE UP (ref 27–34)
MCHC RBC-ENTMCNC: 29.5 PG — SIGNIFICANT CHANGE UP (ref 27–34)
MCHC RBC-ENTMCNC: 31.7 G/DL — LOW (ref 32–36)
MCHC RBC-ENTMCNC: 31.9 G/DL — LOW (ref 32–36)
MCHC RBC-ENTMCNC: 32.5 G/DL — SIGNIFICANT CHANGE UP (ref 32–36)
MCHC RBC-ENTMCNC: 32.7 G/DL — SIGNIFICANT CHANGE UP (ref 32–36)
MCV RBC AUTO: 90.1 FL — SIGNIFICANT CHANGE UP (ref 80–100)
MCV RBC AUTO: 90.1 FL — SIGNIFICANT CHANGE UP (ref 80–100)
MCV RBC AUTO: 90.9 FL — SIGNIFICANT CHANGE UP (ref 80–100)
MCV RBC AUTO: 91.5 FL — SIGNIFICANT CHANGE UP (ref 80–100)
MONOCYTES NFR BLD AUTO: 1 % — LOW (ref 2–14)
MONOCYTES NFR BLD AUTO: SIGNIFICANT CHANGE UP % (ref 2–14)
NEUTROPHILS NFR BLD AUTO: 82 % — HIGH (ref 43–77)
NEUTROPHILS NFR BLD AUTO: SIGNIFICANT CHANGE UP % (ref 43–77)
NITRITE UR-MCNC: NEGATIVE — SIGNIFICANT CHANGE UP
NITRITE UR-MCNC: NEGATIVE — SIGNIFICANT CHANGE UP
OSMOLALITY UR: 506 MOSMOL/KG — SIGNIFICANT CHANGE UP (ref 100–650)
PCO2 BLDA: 30 MMHG — LOW (ref 32–45)
PCO2 BLDA: 32 MMHG — SIGNIFICANT CHANGE UP (ref 32–45)
PCO2 BLDA: 33 MMHG — SIGNIFICANT CHANGE UP (ref 32–45)
PCO2 BLDA: 36 MMHG — SIGNIFICANT CHANGE UP (ref 32–45)
PH BLDA: 7.38 — SIGNIFICANT CHANGE UP (ref 7.35–7.45)
PH BLDA: 7.43 — SIGNIFICANT CHANGE UP (ref 7.35–7.45)
PH BLDA: 7.43 — SIGNIFICANT CHANGE UP (ref 7.35–7.45)
PH BLDA: 7.45 — SIGNIFICANT CHANGE UP (ref 7.35–7.45)
PH UR: 5 — SIGNIFICANT CHANGE UP (ref 5–8)
PH UR: 5.5 — SIGNIFICANT CHANGE UP (ref 5–8)
PHOSPHATE SERPL-MCNC: 6.4 MG/DL — HIGH (ref 2.5–4.5)
PLATELET # BLD AUTO: 184 K/UL — SIGNIFICANT CHANGE UP (ref 150–400)
PLATELET # BLD AUTO: 228 K/UL — SIGNIFICANT CHANGE UP (ref 150–400)
PLATELET # BLD AUTO: 291 K/UL — SIGNIFICANT CHANGE UP (ref 150–400)
PLATELET # BLD AUTO: 347 K/UL — SIGNIFICANT CHANGE UP (ref 150–400)
PO2 BLDA: 110 MMHG — HIGH (ref 83–108)
PO2 BLDA: 305 MMHG — HIGH (ref 83–108)
PO2 BLDA: 85 MMHG — SIGNIFICANT CHANGE UP (ref 83–108)
PO2 BLDA: 92 MMHG — SIGNIFICANT CHANGE UP (ref 83–108)
POTASSIUM SERPL-MCNC: 3.1 MMOL/L — LOW (ref 3.5–5.3)
POTASSIUM SERPL-MCNC: 3.2 MMOL/L — LOW (ref 3.5–5.3)
POTASSIUM SERPL-MCNC: 4.3 MMOL/L — SIGNIFICANT CHANGE UP (ref 3.5–5.3)
POTASSIUM SERPL-MCNC: 5 MMOL/L — SIGNIFICANT CHANGE UP (ref 3.5–5.3)
POTASSIUM SERPL-SCNC: 3.1 MMOL/L — LOW (ref 3.5–5.3)
POTASSIUM SERPL-SCNC: 3.2 MMOL/L — LOW (ref 3.5–5.3)
POTASSIUM SERPL-SCNC: 4.3 MMOL/L — SIGNIFICANT CHANGE UP (ref 3.5–5.3)
POTASSIUM SERPL-SCNC: 5 MMOL/L — SIGNIFICANT CHANGE UP (ref 3.5–5.3)
POTASSIUM UR-SCNC: 64 MMOL/L — SIGNIFICANT CHANGE UP
PROT SERPL-MCNC: 5.4 G/DL — LOW (ref 6–8.3)
PROT SERPL-MCNC: 5.6 G/DL — LOW (ref 6–8.3)
PROT SERPL-MCNC: 6.3 G/DL — SIGNIFICANT CHANGE UP (ref 6–8.3)
PROT SERPL-MCNC: 6.7 G/DL — SIGNIFICANT CHANGE UP (ref 6–8.3)
PROT UR-MCNC: 100 MG/DL
PROT UR-MCNC: 100 MG/DL
PROTHROM AB SERPL-ACNC: 14.4 SEC — HIGH (ref 9.8–12.7)
PROTHROM AB SERPL-ACNC: 14.5 SEC — HIGH (ref 9.8–12.7)
PROTHROM AB SERPL-ACNC: 14.9 SEC — HIGH (ref 9.8–12.7)
PROTHROM AB SERPL-ACNC: 15.9 SEC — HIGH (ref 9.8–12.7)
RBC # BLD: 3.43 M/UL — LOW (ref 3.8–5.2)
RBC # BLD: 3.83 M/UL — SIGNIFICANT CHANGE UP (ref 3.8–5.2)
RBC # BLD: 4.06 M/UL — SIGNIFICANT CHANGE UP (ref 3.8–5.2)
RBC # BLD: 4.69 M/UL — SIGNIFICANT CHANGE UP (ref 3.8–5.2)
RBC # FLD: 16 % — SIGNIFICANT CHANGE UP (ref 10.3–16.9)
RBC # FLD: 16.3 % — SIGNIFICANT CHANGE UP (ref 10.3–16.9)
RH IG SCN BLD-IMP: POSITIVE — SIGNIFICANT CHANGE UP
SAO2 % BLDA: 100 % — SIGNIFICANT CHANGE UP (ref 95–100)
SAO2 % BLDA: 96 % — SIGNIFICANT CHANGE UP (ref 95–100)
SAO2 % BLDA: 97 % — SIGNIFICANT CHANGE UP (ref 95–100)
SAO2 % BLDA: 98 % — SIGNIFICANT CHANGE UP (ref 95–100)
SODIUM SERPL-SCNC: 145 MMOL/L — SIGNIFICANT CHANGE UP (ref 135–145)
SODIUM SERPL-SCNC: 147 MMOL/L — HIGH (ref 135–145)
SODIUM SERPL-SCNC: 149 MMOL/L — HIGH (ref 135–145)
SODIUM SERPL-SCNC: 149 MMOL/L — HIGH (ref 135–145)
SODIUM UR-SCNC: 34 MMOL/L — SIGNIFICANT CHANGE UP
SP GR SPEC: 1.01 — SIGNIFICANT CHANGE UP (ref 1–1.03)
SP GR SPEC: 1.02 — SIGNIFICANT CHANGE UP (ref 1–1.03)
T3 SERPL-MCNC: 62 NG/DL — LOW (ref 80–200)
T4 AB SER-ACNC: 4.78 UG/DL — SIGNIFICANT CHANGE UP (ref 3.17–11.72)
TOTAL CHOLESTEROL/HDL RATIO MEASUREMENT: 6.4 RATIO — SIGNIFICANT CHANGE UP (ref 3.3–7.1)
TRIGL SERPL-MCNC: 130 MG/DL — SIGNIFICANT CHANGE UP (ref 10–149)
TROPONIN T SERPL-MCNC: 0.17 NG/ML — CRITICAL HIGH (ref 0–0.01)
TROPONIN T SERPL-MCNC: 0.26 NG/ML — CRITICAL HIGH (ref 0–0.01)
TSH SERPL-MCNC: 1.35 UIU/ML — SIGNIFICANT CHANGE UP (ref 0.35–4.94)
UROBILINOGEN FLD QL: 1 E.U./DL — SIGNIFICANT CHANGE UP
UROBILINOGEN FLD QL: 1 E.U./DL — SIGNIFICANT CHANGE UP
UUN UR-MCNC: 561 MG/DL — SIGNIFICANT CHANGE UP
VANCOMYCIN TROUGH SERPL-MCNC: 20 UG/ML — SIGNIFICANT CHANGE UP (ref 10–20)
WBC # BLD: 33.2 K/UL — HIGH (ref 3.8–10.5)
WBC # BLD: 41.6 K/UL — CRITICAL HIGH (ref 3.8–10.5)
WBC # BLD: 44.2 K/UL — CRITICAL HIGH (ref 3.8–10.5)
WBC # BLD: 46.7 K/UL — CRITICAL HIGH (ref 3.8–10.5)
WBC # FLD AUTO: 33.2 K/UL — HIGH (ref 3.8–10.5)
WBC # FLD AUTO: 41.6 K/UL — CRITICAL HIGH (ref 3.8–10.5)
WBC # FLD AUTO: 44.2 K/UL — CRITICAL HIGH (ref 3.8–10.5)
WBC # FLD AUTO: 46.7 K/UL — CRITICAL HIGH (ref 3.8–10.5)

## 2018-02-28 PROCEDURE — 76700 US EXAM ABDOM COMPLETE: CPT | Mod: 26

## 2018-02-28 PROCEDURE — 71250 CT THORAX DX C-: CPT | Mod: 26

## 2018-02-28 PROCEDURE — 70450 CT HEAD/BRAIN W/O DYE: CPT | Mod: 26

## 2018-02-28 PROCEDURE — 99291 CRITICAL CARE FIRST HOUR: CPT

## 2018-02-28 PROCEDURE — 99222 1ST HOSP IP/OBS MODERATE 55: CPT

## 2018-02-28 PROCEDURE — 99223 1ST HOSP IP/OBS HIGH 75: CPT

## 2018-02-28 PROCEDURE — 93306 TTE W/DOPPLER COMPLETE: CPT | Mod: 26

## 2018-02-28 PROCEDURE — 95951: CPT | Mod: 26

## 2018-02-28 PROCEDURE — 71045 X-RAY EXAM CHEST 1 VIEW: CPT | Mod: 26,76,59

## 2018-02-28 PROCEDURE — 74176 CT ABD & PELVIS W/O CONTRAST: CPT | Mod: 26

## 2018-02-28 PROCEDURE — 93010 ELECTROCARDIOGRAM REPORT: CPT

## 2018-02-28 PROCEDURE — 93010 ELECTROCARDIOGRAM REPORT: CPT | Mod: 59

## 2018-02-28 RX ORDER — PANTOPRAZOLE SODIUM 20 MG/1
40 TABLET, DELAYED RELEASE ORAL DAILY
Qty: 0 | Refills: 0 | Status: DISCONTINUED | OUTPATIENT
Start: 2018-02-28 | End: 2018-02-28

## 2018-02-28 RX ORDER — MAGNESIUM SULFATE 500 MG/ML
1 VIAL (ML) INJECTION ONCE
Qty: 0 | Refills: 0 | Status: DISCONTINUED | OUTPATIENT
Start: 2018-02-28 | End: 2018-02-28

## 2018-02-28 RX ORDER — PIPERACILLIN AND TAZOBACTAM 4; .5 G/20ML; G/20ML
3.38 INJECTION, POWDER, LYOPHILIZED, FOR SOLUTION INTRAVENOUS EVERY 6 HOURS
Qty: 0 | Refills: 0 | Status: DISCONTINUED | OUTPATIENT
Start: 2018-02-28 | End: 2018-02-28

## 2018-02-28 RX ORDER — INSULIN HUMAN 100 [IU]/ML
6 INJECTION, SOLUTION SUBCUTANEOUS ONCE
Qty: 0 | Refills: 0 | Status: DISCONTINUED | OUTPATIENT
Start: 2018-02-28 | End: 2018-02-28

## 2018-02-28 RX ORDER — SODIUM CHLORIDE 9 MG/ML
1000 INJECTION INTRAMUSCULAR; INTRAVENOUS; SUBCUTANEOUS
Qty: 0 | Refills: 0 | Status: DISCONTINUED | OUTPATIENT
Start: 2018-02-28 | End: 2018-02-28

## 2018-02-28 RX ORDER — VASOPRESSIN 20 [USP'U]/ML
0.03 INJECTION INTRAVENOUS
Qty: 100 | Refills: 0 | Status: DISCONTINUED | OUTPATIENT
Start: 2018-02-28 | End: 2018-02-28

## 2018-02-28 RX ORDER — DEXTROSE 50 % IN WATER 50 %
25 SYRINGE (ML) INTRAVENOUS ONCE
Qty: 0 | Refills: 0 | Status: DISCONTINUED | OUTPATIENT
Start: 2018-02-28 | End: 2018-03-03

## 2018-02-28 RX ORDER — DEXTROSE 50 % IN WATER 50 %
12.5 SYRINGE (ML) INTRAVENOUS ONCE
Qty: 0 | Refills: 0 | Status: DISCONTINUED | OUTPATIENT
Start: 2018-02-28 | End: 2018-03-03

## 2018-02-28 RX ORDER — INSULIN LISPRO 100/ML
VIAL (ML) SUBCUTANEOUS EVERY 6 HOURS
Qty: 0 | Refills: 0 | Status: DISCONTINUED | OUTPATIENT
Start: 2018-02-28 | End: 2018-02-28

## 2018-02-28 RX ORDER — SODIUM CHLORIDE 9 MG/ML
1000 INJECTION INTRAMUSCULAR; INTRAVENOUS; SUBCUTANEOUS
Qty: 0 | Refills: 0 | Status: DISCONTINUED | OUTPATIENT
Start: 2018-02-28 | End: 2018-03-01

## 2018-02-28 RX ORDER — INSULIN HUMAN 100 [IU]/ML
3 INJECTION, SOLUTION SUBCUTANEOUS
Qty: 100 | Refills: 0 | Status: DISCONTINUED | OUTPATIENT
Start: 2018-02-28 | End: 2018-03-01

## 2018-02-28 RX ORDER — POTASSIUM CHLORIDE 20 MEQ
20 PACKET (EA) ORAL
Qty: 0 | Refills: 0 | Status: COMPLETED | OUTPATIENT
Start: 2018-02-28 | End: 2018-03-01

## 2018-02-28 RX ORDER — INSULIN HUMAN 100 [IU]/ML
2 INJECTION, SOLUTION SUBCUTANEOUS
Qty: 250 | Refills: 0 | Status: DISCONTINUED | OUTPATIENT
Start: 2018-02-28 | End: 2018-02-28

## 2018-02-28 RX ORDER — NOREPINEPHRINE BITARTRATE/D5W 8 MG/250ML
0.01 PLASTIC BAG, INJECTION (ML) INTRAVENOUS
Qty: 16 | Refills: 0 | Status: DISCONTINUED | OUTPATIENT
Start: 2018-02-28 | End: 2018-03-01

## 2018-02-28 RX ORDER — SODIUM CHLORIDE 9 MG/ML
1000 INJECTION, SOLUTION INTRAVENOUS
Qty: 0 | Refills: 0 | Status: DISCONTINUED | OUTPATIENT
Start: 2018-02-28 | End: 2018-03-03

## 2018-02-28 RX ORDER — SODIUM CHLORIDE 9 MG/ML
1000 INJECTION INTRAMUSCULAR; INTRAVENOUS; SUBCUTANEOUS ONCE
Qty: 0 | Refills: 0 | Status: COMPLETED | OUTPATIENT
Start: 2018-02-28 | End: 2018-02-28

## 2018-02-28 RX ORDER — ACETAMINOPHEN 500 MG
1000 TABLET ORAL ONCE
Qty: 0 | Refills: 0 | Status: COMPLETED | OUTPATIENT
Start: 2018-02-28 | End: 2018-02-28

## 2018-02-28 RX ORDER — PANTOPRAZOLE SODIUM 20 MG/1
40 TABLET, DELAYED RELEASE ORAL EVERY 12 HOURS
Qty: 0 | Refills: 0 | Status: DISCONTINUED | OUTPATIENT
Start: 2018-02-28 | End: 2018-03-03

## 2018-02-28 RX ORDER — POTASSIUM CHLORIDE 20 MEQ
20 PACKET (EA) ORAL
Qty: 0 | Refills: 0 | Status: DISCONTINUED | OUTPATIENT
Start: 2018-02-28 | End: 2018-02-28

## 2018-02-28 RX ORDER — VASOPRESSIN 20 [USP'U]/ML
0.04 INJECTION INTRAVENOUS
Qty: 100 | Refills: 0 | Status: DISCONTINUED | OUTPATIENT
Start: 2018-02-28 | End: 2018-03-01

## 2018-02-28 RX ORDER — NOREPINEPHRINE BITARTRATE/D5W 8 MG/250ML
0.01 PLASTIC BAG, INJECTION (ML) INTRAVENOUS
Qty: 8 | Refills: 0 | Status: DISCONTINUED | OUTPATIENT
Start: 2018-02-28 | End: 2018-02-28

## 2018-02-28 RX ORDER — PIPERACILLIN AND TAZOBACTAM 4; .5 G/20ML; G/20ML
4.5 INJECTION, POWDER, LYOPHILIZED, FOR SOLUTION INTRAVENOUS ONCE
Qty: 0 | Refills: 0 | Status: COMPLETED | OUTPATIENT
Start: 2018-02-28 | End: 2018-02-28

## 2018-02-28 RX ORDER — INSULIN HUMAN 100 [IU]/ML
3 INJECTION, SOLUTION SUBCUTANEOUS
Qty: 100 | Refills: 0 | Status: DISCONTINUED | OUTPATIENT
Start: 2018-02-28 | End: 2018-02-28

## 2018-02-28 RX ORDER — VANCOMYCIN HCL 1 G
1500 VIAL (EA) INTRAVENOUS ONCE
Qty: 0 | Refills: 0 | Status: COMPLETED | OUTPATIENT
Start: 2018-02-28 | End: 2018-02-28

## 2018-02-28 RX ORDER — CALCIUM GLUCONATE 100 MG/ML
2 VIAL (ML) INTRAVENOUS ONCE
Qty: 0 | Refills: 0 | Status: DISCONTINUED | OUTPATIENT
Start: 2018-02-28 | End: 2018-02-28

## 2018-02-28 RX ORDER — DEXTROSE 50 % IN WATER 50 %
25 SYRINGE (ML) INTRAVENOUS ONCE
Qty: 0 | Refills: 0 | Status: COMPLETED | OUTPATIENT
Start: 2018-02-28 | End: 2018-02-28

## 2018-02-28 RX ORDER — CHLORHEXIDINE GLUCONATE 213 G/1000ML
15 SOLUTION TOPICAL
Qty: 0 | Refills: 0 | Status: DISCONTINUED | OUTPATIENT
Start: 2018-02-28 | End: 2018-03-03

## 2018-02-28 RX ORDER — NOREPINEPHRINE BITARTRATE/D5W 8 MG/250ML
0.06 PLASTIC BAG, INJECTION (ML) INTRAVENOUS
Qty: 16 | Refills: 0 | Status: DISCONTINUED | OUTPATIENT
Start: 2018-02-28 | End: 2018-02-28

## 2018-02-28 RX ORDER — PHYTONADIONE (VIT K1) 5 MG
1 TABLET ORAL ONCE
Qty: 0 | Refills: 0 | Status: COMPLETED | OUTPATIENT
Start: 2018-02-28 | End: 2018-02-28

## 2018-02-28 RX ORDER — GLUCAGON INJECTION, SOLUTION 0.5 MG/.1ML
1 INJECTION, SOLUTION SUBCUTANEOUS ONCE
Qty: 0 | Refills: 0 | Status: DISCONTINUED | OUTPATIENT
Start: 2018-02-28 | End: 2018-03-03

## 2018-02-28 RX ORDER — INSULIN LISPRO 100/ML
VIAL (ML) SUBCUTANEOUS
Qty: 0 | Refills: 0 | Status: DISCONTINUED | OUTPATIENT
Start: 2018-02-28 | End: 2018-02-28

## 2018-02-28 RX ORDER — PHYTONADIONE (VIT K1) 5 MG
9 TABLET ORAL ONCE
Qty: 0 | Refills: 0 | Status: COMPLETED | OUTPATIENT
Start: 2018-02-28 | End: 2018-02-28

## 2018-02-28 RX ORDER — VANCOMYCIN HCL 1 G
VIAL (EA) INTRAVENOUS
Qty: 0 | Refills: 0 | Status: DISCONTINUED | OUTPATIENT
Start: 2018-02-28 | End: 2018-02-28

## 2018-02-28 RX ORDER — INSULIN HUMAN 100 [IU]/ML
2 INJECTION, SOLUTION SUBCUTANEOUS ONCE
Qty: 0 | Refills: 0 | Status: COMPLETED | OUTPATIENT
Start: 2018-02-28 | End: 2018-02-28

## 2018-02-28 RX ORDER — DEXTROSE 50 % IN WATER 50 %
1 SYRINGE (ML) INTRAVENOUS ONCE
Qty: 0 | Refills: 0 | Status: DISCONTINUED | OUTPATIENT
Start: 2018-02-28 | End: 2018-03-03

## 2018-02-28 RX ORDER — PIPERACILLIN AND TAZOBACTAM 4; .5 G/20ML; G/20ML
3.38 INJECTION, POWDER, LYOPHILIZED, FOR SOLUTION INTRAVENOUS EVERY 6 HOURS
Qty: 0 | Refills: 0 | Status: DISCONTINUED | OUTPATIENT
Start: 2018-02-28 | End: 2018-03-02

## 2018-02-28 RX ORDER — VASOPRESSIN 20 [USP'U]/ML
0.01 INJECTION INTRAVENOUS
Qty: 100 | Refills: 0 | Status: DISCONTINUED | OUTPATIENT
Start: 2018-02-28 | End: 2018-02-28

## 2018-02-28 RX ADMIN — PANTOPRAZOLE SODIUM 40 MILLIGRAM(S): 20 TABLET, DELAYED RELEASE ORAL at 18:34

## 2018-02-28 RX ADMIN — SODIUM CHLORIDE 3000 MILLILITER(S): 9 INJECTION INTRAMUSCULAR; INTRAVENOUS; SUBCUTANEOUS at 09:34

## 2018-02-28 RX ADMIN — INSULIN HUMAN 3 UNIT(S)/HR: 100 INJECTION, SOLUTION SUBCUTANEOUS at 12:35

## 2018-02-28 RX ADMIN — PANTOPRAZOLE SODIUM 40 MILLIGRAM(S): 20 TABLET, DELAYED RELEASE ORAL at 07:24

## 2018-02-28 RX ADMIN — Medication 300 MILLIGRAM(S): at 07:22

## 2018-02-28 RX ADMIN — CHLORHEXIDINE GLUCONATE 15 MILLILITER(S): 213 SOLUTION TOPICAL at 18:34

## 2018-02-28 RX ADMIN — Medication 25 GRAM(S): at 22:22

## 2018-02-28 RX ADMIN — Medication 100.2 MILLIGRAM(S): at 12:52

## 2018-02-28 RX ADMIN — Medication 101.8 MILLIGRAM(S): at 15:04

## 2018-02-28 RX ADMIN — SODIUM CHLORIDE 4000 MILLILITER(S): 9 INJECTION INTRAMUSCULAR; INTRAVENOUS; SUBCUTANEOUS at 05:23

## 2018-02-28 RX ADMIN — PIPERACILLIN AND TAZOBACTAM 200 GRAM(S): 4; .5 INJECTION, POWDER, LYOPHILIZED, FOR SOLUTION INTRAVENOUS at 06:15

## 2018-02-28 RX ADMIN — SODIUM CHLORIDE 100 MILLILITER(S): 9 INJECTION INTRAMUSCULAR; INTRAVENOUS; SUBCUTANEOUS at 07:23

## 2018-02-28 RX ADMIN — PIPERACILLIN AND TAZOBACTAM 200 GRAM(S): 4; .5 INJECTION, POWDER, LYOPHILIZED, FOR SOLUTION INTRAVENOUS at 23:44

## 2018-02-28 RX ADMIN — PIPERACILLIN AND TAZOBACTAM 200 GRAM(S): 4; .5 INJECTION, POWDER, LYOPHILIZED, FOR SOLUTION INTRAVENOUS at 18:34

## 2018-02-28 RX ADMIN — SODIUM CHLORIDE 100 MILLILITER(S): 9 INJECTION INTRAMUSCULAR; INTRAVENOUS; SUBCUTANEOUS at 12:15

## 2018-02-28 RX ADMIN — Medication 400 MILLIGRAM(S): at 06:15

## 2018-02-28 RX ADMIN — INSULIN HUMAN 2 UNIT(S): 100 INJECTION, SOLUTION SUBCUTANEOUS at 12:37

## 2018-02-28 RX ADMIN — VASOPRESSIN 2.4 UNIT(S)/MIN: 20 INJECTION INTRAVENOUS at 12:14

## 2018-02-28 RX ADMIN — Medication 0.84 MICROGRAM(S)/KG/MIN: at 12:15

## 2018-02-28 RX ADMIN — PIPERACILLIN AND TAZOBACTAM 200 GRAM(S): 4; .5 INJECTION, POWDER, LYOPHILIZED, FOR SOLUTION INTRAVENOUS at 12:14

## 2018-02-28 RX ADMIN — SODIUM CHLORIDE 4000 MILLILITER(S): 9 INJECTION INTRAMUSCULAR; INTRAVENOUS; SUBCUTANEOUS at 06:16

## 2018-02-28 NOTE — PROCEDURE NOTE - PROCEDURE
<<-----Click on this checkbox to enter Procedure Central venous catheter insertion  02/28/2018    Active  EVANGELIST

## 2018-02-28 NOTE — H&P ADULT - HISTORY OF PRESENT ILLNESS
62Y F with PMH Bipolar Disorder, Schizophrenia, depression, CAD, COPD, HTN, HLD, DMII, TIA (03/2017) presented to [facility] 2/18 for altered mental status, found to have Na 108. Patient was admitted to the medical ICU, which per sign out from  she was treated with hypertonic saline. Her treatment complicated by agitation, requiring 1:1 constant observation. Patient was stepped down to F on 2/25 but on 2/27 at 4:30PM patient had acute onset R sided weakness c/b respiratory distress requiring intubation. Patient became hypotensive post intubation (85/56) non responsive to IVF and started on Levophed. CTH and CTA was done at 7:30PM and read resulted at 02:00AM, demonstrating L frontal ischemic changes and L ICA occlusion and primary team was instructed to contact [facility] for potential intervnetion. Of note, patient was febrile yesterday at 11AM (T101.3) and prior to transport at T101.3 124/71 T128 (reported sinus); tyelnol was reportedly given.     Per note, most recent WBC was 62Y F with PMH Bipolar Disorder, Schizophrenia, depression, CAD, COPD, HTN, HLD, DMII, TIA (03/2017) presented to [facility] 2/18 for altered mental status, found to have Na 108. Patient was admitted to the medical ICU, which per sign out from  she was treated with hypertonic saline. Her treatment complicated by agitation, requiring 1:1 constant observation. Patient was stepped down to RMF on 2/25 but on 2/27 at 4:30PM patient had acute onset R sided weakness c/b respiratory distress requiring intubation. Patient became hypotensive post intubation (85/56) non responsive to IVF and started on Levophed. At that time, patient with WBC 32, lacate 3.9 and started on Vanc and Zosyn for LLL PNA. CTH and CTA were done at 7:30PM and read resulted at 02:00AM, demonstrating L frontal ischemic changes and L ICA occlusion and primary team was instructed to contact [facility] for potential intervention Of note, patient was febrile yesterday at 11AM (T101.3) and prior to transport at T101.3 124/71 T128 (reported sinus); Tylenol was reportedly given. Per discharge documentation, most recent WB C 47.4

## 2018-02-28 NOTE — H&P ADULT - NSHPLABSRESULTS_GEN_ALL_CORE
.  LABS:                         13.7   46.7  )-----------( 347      ( 28 Feb 2018 05:13 )             42.9     02-28    147<H>  |  103  |  56<H>  ----------------------------<  240<H>  5.0   |  18<L>  |  2.73<H>    Ca    9.3      28 Feb 2018 05:53  Phos  6.4     02-28  Mg     2.8     02-28    TPro  6.7  /  Alb  3.3  /  TBili  0.9  /  DBili  x   /  AST  463<H>  /  ALT  354<H>  /  AlkPhos  74  02-28    PT/INR - ( 28 Feb 2018 05:13 )   PT: 14.5 sec;   INR: 1.30          PTT - ( 28 Feb 2018 05:13 )  PTT:30.2 sec    Lactate, Blood: 4.7 mmoL/L (02-28 @ 05:16)      RADIOLOGY, EKG & ADDITIONAL TESTS: Reviewed. .  LABS:                         13.7   46.7  )-----------( 347      ( 28 Feb 2018 05:13 )             42.9     02-28    147<H>  |  103  |  56<H>  ----------------------------<  240<H>  5.0   |  18<L>  |  2.73<H>    Ca    9.3      28 Feb 2018 05:53  Phos  6.4     02-28  Mg     2.8     02-28    TPro  6.7  /  Alb  3.3  /  TBili  0.9  /  DBili  x   /  AST  463<H>  /  ALT  354<H>  /  AlkPhos  74  02-28    PT/INR - ( 28 Feb 2018 05:13 )   PT: 14.5 sec;   INR: 1.30          PTT - ( 28 Feb 2018 05:13 )  PTT:30.2 sec    Lactate, Blood: 4.7 mmoL/L (02-28 @ 05:16)    RADIOLOGY, EKG & ADDITIONAL TESTS: Reviewed.

## 2018-02-28 NOTE — CONSULT NOTE ADULT - SUBJECTIVE AND OBJECTIVE BOX
HPI:  62yr old F with PMHx significant for Bipolar Disorder, Schizophrenia, depression, CAD, COPD, HTN, HLD, DMII, TIA (2017) transferred from [facility] for evaluation of R sided weakness.   Patient is currently sedated and intubated and unable to give any hx.  Most of the hx is obtained from the medical record  Patient was admitted to [facility] for evaluation of AMS in setting of hyponatremia, which was Rx with hypertonic saline, c/b agitation. She was noted on  to have acute onset R sided weakness, c/b respiratory distress requiring intubation. She was transferred after CT head showed L frontal ischemic changes     for altered mental status, found to have Na 108. Patient was admitted to the medical ICU, which per sign out from  she was treated with hypertonic saline. Her treatment complicated by agitation, requiring 1:1 constant observation. Patient was stepped down to RMF on  but on  at 4:30PM patient had acute onset R sided weakness c/b respiratory distress requiring intubation. Patient became hypotensive post intubation (85/56) non responsive to IVF and started on Levophed. At that time, patient with WBC 32, lacate 3.9 and started on Vanc and Zosyn for LLL PNA. CTH and CTA were done at 7:30PM and read resulted at 02:00AM, demonstrating L frontal ischemic changes and L ICA occlusion and primary team was instructed to contact [facility] for potential intervention Of note, patient was febrile yesterday at 11AM (T101.3) and prior to transport at T101.3 124/71 T128 (reported sinus); Tylenol was reportedly given. Per discharge documentation, most recent WB C 47.4 (2018 05:14)      PAST MEDICAL & SURGICAL HISTORY:  Depression  Anxiety  Schizoaffective disorder  Fibromyalgia  Diabetes mellitus  COPD (chronic obstructive pulmonary disease)  Bipolar disorder  Hypertension  PVD (peripheral vascular disease)  Polyp of sinus  Ovarian cyst, bilateral  S/P tonsillectomy      REVIEW OF SYSTEMS      General:	    Skin/Breast:  	  Ophthalmologic:  	  ENMT:	    Respiratory and Thorax:  	  Cardiovascular:	    Gastrointestinal:	    Genitourinary:	    Musculoskeletal:	    Neurological:	    Psychiatric:	    Hematology/Lymphatics:	    Endocrine:	    Allergic/Immunologic:	    MEDICATIONS  (STANDING):  chlorhexidine 0.12% Liquid 15 milliLiter(s) Swish and Spit two times a day  dextrose 5%. 1000 milliLiter(s) (50 mL/Hr) IV Continuous <Continuous>  dextrose 50% Injectable 12.5 Gram(s) IV Push once  dextrose 50% Injectable 25 Gram(s) IV Push once  dextrose 50% Injectable 25 Gram(s) IV Push once  insulin Infusion 3 Unit(s)/Hr (3 mL/Hr) IV Continuous <Continuous>  norepinephrine Infusion 0.01 MICROgram(s)/kG/Min (0.836 mL/Hr) IV Continuous <Continuous>  pantoprazole  Injectable 40 milliGRAM(s) IV Push every 12 hours  piperacillin/tazobactam IVPB. 3.375 Gram(s) IV Intermittent every 6 hours  sodium chloride 0.9%. 1000 milliLiter(s) (100 mL/Hr) IV Continuous <Continuous>  vasopressin Infusion 0.04 Unit(s)/Min (2.4 mL/Hr) IV Continuous <Continuous>    MEDICATIONS  (PRN):  dextrose Gel 1 Dose(s) Oral once PRN Blood Glucose LESS THAN 70 milliGRAM(s)/deciliter  glucagon  Injectable 1 milliGRAM(s) IntraMuscular once PRN Glucose LESS THAN 70 milligrams/deciliter      Allergies    Cipro (Other)  Geodon (Other)  Haldol (Other)  SULFA (Swelling)  ziprasidone (Unknown)    Intolerances        SOCIAL HISTORY:    FAMILY HISTORY:  Family history of prostate cancer (Sibling)  Family history of breast cancer (Sibling)  Family history of pancreatic cancer (Sibling)      Vital Signs Last 24 Hrs  T(C): 37.4 (2018 14:00), Max: 40.6 (2018 04:45)  T(F): 99.3 (2018 14:00), Max: 105 (2018 04:45)  HR: 72 (2018 16:00) (72 - 128)  BP: 124/91 (2018 11:30) (72/40 - 155/74)  BP(mean): 110 (2018 11:30) (55 - 110)  RR: 24 (2018 16:00) (20 - 30)  SpO2: 98% (2018 16:00) (94% - 100%)    PHYSICAL EXAM:      Constitutional:    Eyes:    ENMT:    Neck:    Breasts:    Back:    Respiratory:    Cardiovascular:    Gastrointestinal:    Genitourinary:    Rectal:    Extremities:    Vascular:    Neurological:    Skin:    Lymph Nodes:    Musculoskeletal:    Psychiatric:        LABS:                        11.3   41.6  )-----------( 228      ( 2018 13:59 )             34.8     149<H>  |  112<H>  |  63<H>  ----------------------------<  243<H>  3.2<L>   |  20<L>  |  2.95<H>    Ca    7.8<L>      2018 13:59  Phos  6.4       Mg     2.3         TPro  5.4<L>  /  Alb  3.0<L>  /  TBili  0.7  /  DBili  x   /  AST  547<H>  /  ALT  490<H>  /  AlkPhos  67      PT/INR - ( 2018 13:59 )   PT: 14.9 sec;   INR: 1.33          PTT - ( 2018 13:59 )  PTT:24.8 sec  Urinalysis Basic - ( 2018 13:49 )    Color: Yellow / Appearance: Clear / S.020 / pH: x  Gluc: x / Ketone: NEGATIVE  / Bili: Negative / Urobili: 1.0 E.U./dL   Blood: x / Protein: 100 mg/dL / Nitrite: NEGATIVE   Leuk Esterase: NEGATIVE / RBC: < 5 /HPF / WBC > 10 /HPF   Sq Epi: x / Non Sq Epi: 0-5 /HPF / Bacteria: Present /HPF        RADIOLOGY & ADDITIONAL STUDIES: HPI:  62yr old F with PMHx significant for Bipolar Disorder, Schizophrenia, depression, CAD, COPD, HTN, HLD, DMII, TIA (2017) transferred from [facility] for evaluation of R sided weakness.   GI consulted for hematemesis  Patient is currently sedated and intubated and unable to give any hx.  Most of the hx is obtained from the medical record  Patient was admitted to [facility] for evaluation of AMS in setting of hyponatremia, which was Rx with hypertonic saline, c/b agitation. She was noted on  to have acute onset R sided weakness, c/b respiratory distress requiring intubation. She was transferred after CT head showed L frontal ischemic changes and L ICA occlusion for further management at [facility]. Following transfer patient was found to be in septic shock, DKA, renal failure, Tmax of 105, after placement of OGT coffee ground emesis was noted, and there was a slight downward trend in her Hgb from 13-11, but no melena or BRBPR was reported.    EGD - unable to recall  Colonoscopy - unable to recall    PAST MEDICAL & SURGICAL HISTORY:  Depression  Anxiety  Schizoaffective disorder  Fibromyalgia  Diabetes mellitus  COPD (chronic obstructive pulmonary disease)  Bipolar disorder  Hypertension  PVD (peripheral vascular disease)  Polyp of sinus  Ovarian cyst, bilateral  S/P tonsillectomy      REVIEW OF SYSTEMS  Apart from items noted in the HPI a 10point ROS could not be performed due to patient being sedated    MEDICATIONS  (STANDING):  chlorhexidine 0.12% Liquid 15 milliLiter(s) Swish and Spit two times a day  dextrose 5%. 1000 milliLiter(s) (50 mL/Hr) IV Continuous <Continuous>  dextrose 50% Injectable 12.5 Gram(s) IV Push once  dextrose 50% Injectable 25 Gram(s) IV Push once  dextrose 50% Injectable 25 Gram(s) IV Push once  insulin Infusion 3 Unit(s)/Hr (3 mL/Hr) IV Continuous <Continuous>  norepinephrine Infusion 0.01 MICROgram(s)/kG/Min (0.836 mL/Hr) IV Continuous <Continuous>  pantoprazole  Injectable 40 milliGRAM(s) IV Push every 12 hours  piperacillin/tazobactam IVPB. 3.375 Gram(s) IV Intermittent every 6 hours  sodium chloride 0.9%. 1000 milliLiter(s) (100 mL/Hr) IV Continuous <Continuous>  vasopressin Infusion 0.04 Unit(s)/Min (2.4 mL/Hr) IV Continuous <Continuous>    MEDICATIONS  (PRN):  dextrose Gel 1 Dose(s) Oral once PRN Blood Glucose LESS THAN 70 milliGRAM(s)/deciliter  glucagon  Injectable 1 milliGRAM(s) IntraMuscular once PRN Glucose LESS THAN 70 milligrams/deciliter      Allergies  Cipro (Other)  Geodon (Other)  Haldol (Other)  SULFA (Swelling)  ziprasidone (Unknown)    Intolerances    SOCIAL HISTORY:  Unable to obtain    FAMILY HISTORY:  Family history of prostate cancer (Sibling)  Family history of breast cancer (Sibling)  Family history of pancreatic cancer (Sibling)      Vital Signs Last 24 Hrs  T(C): 37.4 (2018 14:00), Max: 40.6 (2018 04:45)  T(F): 99.3 (2018 14:00), Max: 105 (2018 04:45)  HR: 72 (2018 16:00) (72 - 128)  BP: 124/91 (2018 11:30) (72/40 - 155/74)  BP(mean): 110 (2018 11:30) (55 - 110)  RR: 24 (2018 16:00) (20 - 30)  SpO2: 98% (2018 16:00) (94% - 100%)    PHYSICAL EXAM:  GEN - middle aged obese F lying in bed in no distress, sedated and intubated, ill appearing, EEG on scalp  Eyes: fixed pupils  Respiratory: CTA  Cardiovascular: s1 s2 no M RRR  Gastrointestinal: full, obese, soft, BS+, NT, NR< NG , no masses or organs palpated  Rectal: deferred  Extremities: no pitting LE edema  Neurological: sedated, non responsive to noxious stimuli  Skin: intact      LABS:                        11.3   41.6  )-----------( 228      ( 2018 13:59 )             34.8     149<H>  |  112<H>  |  63<H>  ----------------------------<  243<H>  3.2<L>   |  20<L>  |  2.95<H>    Ca    7.8<L>      2018 13:59  Phos  6.4     02-28  Mg     2.3         TPro  5.4<L>  /  Alb  3.0<L>  /  TBili  0.7  /  DBili  x   /  AST  547<H>  /  ALT  490<H>  /  AlkPhos  67      PT/INR - ( 2018 13:59 )   PT: 14.9 sec;   INR: 1.33       PTT - ( 2018 13:59 )  PTT:24.8 sec    Urinalysis Basic - ( 2018 13:49 )  Color: Yellow / Appearance: Clear / S.020 / pH: x  Gluc: x / Ketone: NEGATIVE  / Bili: Negative / Urobili: 1.0 E.U./dL   Blood: x / Protein: 100 mg/dL / Nitrite: NEGATIVE   Leuk Esterase: NEGATIVE / RBC: < 5 /HPF / WBC > 10 /HPF   Sq Epi: x / Non Sq Epi: 0-5 /HPF / Bacteria: Present /HPF          RADIOLOGY & ADDITIONAL STUDIES:  CT Abdomen and Pelvis No Cont (18 @ 13:18) >  IMPRESSION:  Patchy peribronchovascular consolidations are seen in both lower lobes, as well as patchy groundglass opacities in both upper lobes. Differential diagnosis includes inflammatory etiologies (i.e. granulomatosis with polyangiitis, organizing pneumonia, sarcoid) or infectious (i.e. multifocal pneumonia).    Partially visualized large right groin hematoma.    Prominent bilateral renal parenchyma with patchy contrast retention.   Findings could represent acute tubular necrosis/contrast induced nephrotoxicity, less likely renal infarction or pyelonephritis. Correlate clinically.    Hepatic steatosis.

## 2018-02-28 NOTE — PROCEDURE NOTE - NSPROCDETAILS_GEN_ALL_CORE
connected to a pressurized flush line/positive blood return obtained via catheter/sutured in place/hemostasis with direct pressure, dressing applied/ultrasound guidance/location identified, draped/prepped, sterile technique used, needle inserted/introduced/all materials/supplies accounted for at end of procedure
sterile technique, catheter placed/ultrasound guidance/lumen(s) aspirated and flushed/sterile dressing applied/guidewire recovered

## 2018-02-28 NOTE — CONSULT NOTE ADULT - PROBLEM SELECTOR RECOMMENDATION 9
-neuro checks  -obtain CTH, CTA head and neck   -d/w Dr. Wagner -neuro checks  -obtain stat CTH, CTA head and neck for possible thromectomy    -d/w Dr. Wagner

## 2018-02-28 NOTE — CONSULT NOTE ADULT - ATTENDING COMMENTS
I have reviewed the medical record, including laboratory and radiographic studies, examined the patient and discussed the plan with Dr. Contreras, the ID Resident.  Agree with above.  Will continue to follow with you – ID service.

## 2018-02-28 NOTE — H&P ADULT - ASSESSMENT
ID  SEPTIC SHOCK: Patient reportedly febrile (Tm101.3), tachycardic, hypotensive requiring levophed. Started on Vanc and Zosyn. Unclear source. Given>48h hospitalization consider coverage for hospital acquired bacteria. On admission T 105 (rectal), HR >130s, WBC 46.7, Lactate 4.7 s/p 1L Bolus, Ofirmev  - Vanc and Zosyn   - asses fluid status and ensure adequate fluid resuscitate  - Blood Cultures, CXR, UA, RVP, Lactate  - continue to monitor    NEURO  r/o STROKE: patient w/ acute onset R sided weakness at 2/27 16:30 c/b resp distress s/p intubation. CTH CTA with L ICA occlusion.CTH CTA deferred as patient unstable. Pupils fixed, unresponsive to pain, R Gaze  - Echo with doppler  - Lipid Panel, A1c, TSH, ECG  - OT/PT when extubated/indicated    PULM:  Acute Respiratory Failure: s/p intubation for reported desaturation during rapid response for R sided weakness.Per discharge, ?LLL consolidation. Currently on AC/VC  - CXR - f/u results   - Vent care: HOB>30 degrees, chlorhexidine DVT PPX, frequent suctionin    PSYCH:  Schizophrenia: known h/o schizophrenia, with reported agitation at hospital requiring 1:1, now intubated.   - obtain collateral in AM     FEN  F: NS@120cc/Hr  E: Replete PRN  N: NPO  Lines:     FULL CODE   CONTACT: Spouse [phone] (per chart) 62Y F with PMH Bipolar Disorder, Schizophrenia, depression, CAD, COPD, HTN, HLD, DMII, TIA (03/2017) transfer from [facility] for potential intervention for L ICA occlusion    ID  SEPTIC SHOCK: Patient reportedly febrile (Tm101.3), tachycardic, hypotensive requiring levophed. Started on Vanc and Zosyn. Unclear source. Given>48h hospitalization consider coverage for hospital acquired bacteria. On admission T 105 (rectal), HR >130s, WBC 46.7, Lactate 4.7 s/p 1L Bolus, Ofirmev. Unclear etiology, consider aspiration PNA in setting of stroke and intubation.    - Vanc and Zosyn   - asses fluid status and ensure adequate fluid resuscitate  - Blood Cultures, CXR, UA, RVP, Lactate  - continue to monitor    MET  AG Metabolic Acidosis: AG 26 Lactate 4.7 on admission likely 2/2 to septic shock, unclear infectious etiology  - IVF resuscitation  - continue to trend lactate <2    NEURO  r/o STROKE: patient w/ acute onset R sided weakness at 2/27 16:30 c/b resp distress s/p intubation. CTH CTA with L ICA occlusion. CTH CTA deferred as patient unstable. Pupils fixed, unresponsive to pain, R Gaze  - Echo with doppler  - Lipid Panel, A1c, TSH, ECG  - OT/PT when extubated/indicated    PULM  Acute Respiratory Failure: s/p intubation for reported desaturation during rapid response for R sided weakness. Per discharge, ?LLL consolidation. Currently on AC/VC  - CXR - f/u results   - Vent care: HOB>30 degrees, chlorhexidine DVT PPX, frequent suctioning    RENAL  PATSY: BUN: Cr 56:2.73, unclear baseline, likely prerenal in the setting of hypovolemia and septic shock.   - IVF resuscitation and continue to trend BMP  - f/u Urine lytes  - Renal US if persists    PSYCH:  Schizophrenia: known h/o schizophrenia, with reported agitation at hospital requiring 1:1, now intubated.   - obtain collateral in AM     FEN  F: NS@120cc/Hr  E: Replete PRN  N: NPO  Protonix 40mg Qd, HepSubQ, SCDs    FULL CODE   CONTACT: Spouse [phone] (per chart)

## 2018-02-28 NOTE — CONSULT NOTE ADULT - SUBJECTIVE AND OBJECTIVE BOX
HISTORY OF PRESENT ILLNESS:   62Y F with PMH Bipolar Disorder, Schizophrenia, depression, CAD, COPD, HTN, HLD, DMII, TIA (03/2017) presented to [facility] 2/18 for altered mental status, found to have Na 108. Patient was admitted to the medical ICU, which per sign out from  she was treated with hypertonic saline. Her treatment complicated by agitation, requiring 1:1 constant observation. Patient was stepped down to RMF on 2/25 but on 2/27 at 4:30PM patient had acute onset R sided weakness c/b respiratory distress requiring intubation. Patient became hypotensive post intubation (85/56) non responsive to IVF and started on Levophed. At that time, patient with WBC 32, lacate 3.9 and started on Vanc and Zosyn for LLL PNA. CTH and CTA were done at 7:30PM and read resulted at 02:00AM, demonstrating L frontal ischemic changes and L ICA occlusion and primary team was instructed to contact [facility] for potential intervention Of note, patient was febrile yesterday at 11AM (T101.3) and prior to transport at T101.3 124/71 T128 (reported sinus); Tylenol was reportedly given. Per discharge documentation, most recent WB C 47.4    Neurosurgery consulted for possible endovascular intervention.      PAST MEDICAL & SURGICAL HISTORY:  Depression  Anxiety  Schizoaffective disorder  Fibromyalgia  Diabetes mellitus  COPD (chronic obstructive pulmonary disease)  Bipolar disorder  Hypertension  PVD (peripheral vascular disease)  Polyp of sinus  Ovarian cyst, bilateral  S/P tonsillectomy    FAMILY HISTORY:  Family history of prostate cancer (Sibling)  Family history of breast cancer (Sibling)  Family history of pancreatic cancer (Sibling)      SOCIAL HISTORY:  Tobacco Use:  EtOH use:   Substance:    Allergies    Cipro (Other)  Geodon (Other)  Haldol (Other)  SULFA (Swelling)  ziprasidone (Unknown)    Intolerances        REVIEW OF SYSTEMS  Unable to assess       MEDICATIONS:  Antibiotics:  vancomycin  IVPB 1500 milliGRAM(s) IV Intermittent once    Neuro:    Anticoagulation:    OTHER:  chlorhexidine 0.12% Liquid 15 milliLiter(s) Swish and Spit two times a day  dextrose 50% Injectable 12.5 Gram(s) IV Push once  dextrose 50% Injectable 25 Gram(s) IV Push once  dextrose 50% Injectable 25 Gram(s) IV Push once  dextrose Gel 1 Dose(s) Oral once PRN  glucagon  Injectable 1 milliGRAM(s) IntraMuscular once PRN  insulin lispro (HumaLOG) corrective regimen sliding scale   SubCutaneous three times a day before meals  norepinephrine Infusion 0.06 MICROgram(s)/kG/Min IV Continuous <Continuous>  pantoprazole  Injectable 40 milliGRAM(s) IV Push daily  vasopressin Infusion 0.03 Unit(s)/Min IV Continuous <Continuous>    IVF:  calcium gluconate IVPB 2 Gram(s) IV Intermittent once  dextrose 5%. 1000 milliLiter(s) IV Continuous <Continuous>  magnesium sulfate  IVPB 1 Gram(s) IV Intermittent once  potassium chloride  20 mEq/100 mL IVPB 20 milliEquivalent(s) IV Intermittent every 2 hours  sodium chloride 0.9%. 1000 milliLiter(s) IV Continuous <Continuous>      Vital Signs Last 24 Hrs  T(C): 40.6 (28 Feb 2018 04:45), Max: 40.6 (28 Feb 2018 04:45)  T(F): 105 (28 Feb 2018 04:45), Max: 105 (28 Feb 2018 04:45)  HR: 114 (28 Feb 2018 06:00) (112 - 128)  BP: 141/57 (28 Feb 2018 06:00) (72/40 - 152/69)  BP(mean): 79 (28 Feb 2018 06:00) (55 - 93)  RR: 22 (28 Feb 2018 06:00) (20 - 23)  SpO2: 97% (28 Feb 2018 06:00) (97% - 100%)    PHYSICAL EXAM:  NEURO:  Intubated, no sedation, not OE, no FC  Pupils 3mm b/l, non reactive, R gaze preference, no dolls, +cough/gag  No response to noxious stimuli in extremities     LABS:                        13.7   46.7  )-----------( 347      ( 28 Feb 2018 05:13 )             42.9           PT/INR - ( 28 Feb 2018 05:13 )   PT: 14.5 sec;   INR: 1.30          PTT - ( 28 Feb 2018 05:13 )  PTT:30.2 sec    CULTURES:      RADIOLOGY & ADDITIONAL STUDIES: HISTORY OF PRESENT ILLNESS:   62Y F with PMH Bipolar Disorder, Schizophrenia, depression, CAD, COPD, HTN, HLD, DMII, TIA (03/2017) presented to [facility] 2/18 for altered mental status, found to have Na 108. Patient was admitted to the medical ICU, which per sign out from  she was treated with hypertonic saline. Her treatment complicated by agitation, requiring 1:1 constant observation. Patient was stepped down to RMF on 2/25 but on 2/27 at 4:30PM patient had acute onset R sided weakness c/b respiratory distress requiring intubation. Patient became hypotensive post intubation (85/56) non responsive to IVF and started on Levophed. At that time, patient with WBC 32, lacate 3.9 and started on Vanc and Zosyn for LLL PNA. CTH and CTA were done at 7:30PM and read resulted at 02:00AM, demonstrating L frontal ischemic changes and L ICA occlusion and primary team was instructed to contact [facility] for potential intervention Of note, patient was febrile yesterday at 11AM (T101.3) and prior to transport at T101.3 124/71 T128 (reported sinus); Tylenol was reportedly given. Per discharge documentation, most recent WB C 47.4    Neurosurgery consulted for possible neuroendovascular intervention. NIHSS 38      PAST MEDICAL & SURGICAL HISTORY:  Depression  Anxiety  Schizoaffective disorder  Fibromyalgia  Diabetes mellitus  COPD (chronic obstructive pulmonary disease)  Bipolar disorder  Hypertension  PVD (peripheral vascular disease)  Polyp of sinus  Ovarian cyst, bilateral  S/P tonsillectomy    FAMILY HISTORY:  Family history of prostate cancer (Sibling)  Family history of breast cancer (Sibling)  Family history of pancreatic cancer (Sibling)      SOCIAL HISTORY:  Tobacco Use:  EtOH use:   Substance:    Allergies    Cipro (Other)  Geodon (Other)  Haldol (Other)  SULFA (Swelling)  ziprasidone (Unknown)    Intolerances        REVIEW OF SYSTEMS  Unable to assess       MEDICATIONS:  Antibiotics:  vancomycin  IVPB 1500 milliGRAM(s) IV Intermittent once    Neuro:    Anticoagulation:    OTHER:  chlorhexidine 0.12% Liquid 15 milliLiter(s) Swish and Spit two times a day  dextrose 50% Injectable 12.5 Gram(s) IV Push once  dextrose 50% Injectable 25 Gram(s) IV Push once  dextrose 50% Injectable 25 Gram(s) IV Push once  dextrose Gel 1 Dose(s) Oral once PRN  glucagon  Injectable 1 milliGRAM(s) IntraMuscular once PRN  insulin lispro (HumaLOG) corrective regimen sliding scale   SubCutaneous three times a day before meals  norepinephrine Infusion 0.06 MICROgram(s)/kG/Min IV Continuous <Continuous>  pantoprazole  Injectable 40 milliGRAM(s) IV Push daily  vasopressin Infusion 0.03 Unit(s)/Min IV Continuous <Continuous>    IVF:  calcium gluconate IVPB 2 Gram(s) IV Intermittent once  dextrose 5%. 1000 milliLiter(s) IV Continuous <Continuous>  magnesium sulfate  IVPB 1 Gram(s) IV Intermittent once  potassium chloride  20 mEq/100 mL IVPB 20 milliEquivalent(s) IV Intermittent every 2 hours  sodium chloride 0.9%. 1000 milliLiter(s) IV Continuous <Continuous>      Vital Signs Last 24 Hrs  T(C): 40.6 (28 Feb 2018 04:45), Max: 40.6 (28 Feb 2018 04:45)  T(F): 105 (28 Feb 2018 04:45), Max: 105 (28 Feb 2018 04:45)  HR: 114 (28 Feb 2018 06:00) (112 - 128)  BP: 141/57 (28 Feb 2018 06:00) (72/40 - 152/69)  BP(mean): 79 (28 Feb 2018 06:00) (55 - 93)  RR: 22 (28 Feb 2018 06:00) (20 - 23)  SpO2: 97% (28 Feb 2018 06:00) (97% - 100%)    PHYSICAL EXAM:  NEURO:  Intubated, no sedation, somnolent, not OE to noxious, no FC  Pupils 3mm non reactive b/l, R gaze deviation, no dolls, no corneals, +cough/gag  No response to noxious stimuli in extremities     LABS:                        13.7   46.7  )-----------( 347      ( 28 Feb 2018 05:13 )             42.9           PT/INR - ( 28 Feb 2018 05:13 )   PT: 14.5 sec;   INR: 1.30          PTT - ( 28 Feb 2018 05:13 )  PTT:30.2 sec    CULTURES:      RADIOLOGY & ADDITIONAL STUDIES:

## 2018-02-28 NOTE — PROVIDER CONTACT NOTE (MEDICATION) - ACTION/TREATMENT ORDERED:
Stop insulin drip & administer 1 amp of D50. Recheck FS in 30 mins.
Per MD, will calculate pt's insulin requirements and follow-up with new orders.

## 2018-02-28 NOTE — CONSULT NOTE ADULT - ASSESSMENT
63 yo female transferred from [facility], CTH/CTA performed showing L frontal ischemic changes and L ICA occlusion. Pt transferred to [facility] for possible endovascular intervention 61 yo female transferred from [facility], CTH/CTA performed showing L frontal ischemic changes and L ICA occlusion. Pt transferred to [facility] for possible neuroendovascular intervention. NIHSS 38

## 2018-02-28 NOTE — CONSULT NOTE ADULT - SUBJECTIVE AND OBJECTIVE BOX
INFECTIOUS DISEASE SERVICE INITIAL CONSULT NOTE    HPI:  62Y F with PMH Bipolar Disorder, Schizophrenia, depression, CAD, COPD, HTN, HLD, DMII, TIA (2017) presented to [facility]  for altered mental status, found to have Na 108. Patient was admitted to the medical ICU, which per sign out from  she was treated with hypertonic saline. Her treatment complicated by agitation, requiring 1:1 constant observation. Patient was stepped down to RMF on  but on  at 4:30PM patient had acute onset R sided weakness c/b respiratory distress requiring intubation. Patient became hypotensive post intubation (85/56) non responsive to IVF and started on Levophed. At that time, patient with WBC 32, lacate 3.9 and started on Vanc and Zosyn for LLL PNA. CTH and CTA were done at 7:30PM and read resulted at 02:00AM, demonstrating L frontal ischemic changes and L ICA occlusion and primary team was instructed to contact [facility] for potential intervention Of note, patient was febrile yesterday at 11AM (T101.3) and prior to transport at T101.3 124/71 T128 (reported sinus); Tylenol was reportedly given. Per discharge documentation, most recent WB C 47.4 (2018 05:14)    PAST MEDICAL & SURGICAL HISTORY:  Depression  Anxiety  Schizoaffective disorder  Fibromyalgia  Diabetes mellitus  COPD (chronic obstructive pulmonary disease)  Bipolar disorder  Hypertension  PVD (peripheral vascular disease)  Polyp of sinus  Ovarian cyst, bilateral  S/P tonsillectomy    REVIEW OF SYSTEMS:  Unable to obtain 2/2 patient's current condition    ACTIVE ANTIMICROBIAL/ANTIBIOTIC MEDICATIONS:  piperacillin/tazobactam IVPB. 3.375 Gram(s) IV Intermittent every 6 hours    OTHER MEDICATIONS:  chlorhexidine 0.12% Liquid 15 milliLiter(s) Swish and Spit two times a day  dextrose 5%. 1000 milliLiter(s) IV Continuous <Continuous>  dextrose 50% Injectable 12.5 Gram(s) IV Push once  dextrose 50% Injectable 25 Gram(s) IV Push once  dextrose 50% Injectable 25 Gram(s) IV Push once  dextrose Gel 1 Dose(s) Oral once PRN  glucagon  Injectable 1 milliGRAM(s) IntraMuscular once PRN  insulin Infusion 3 Unit(s)/Hr IV Continuous <Continuous>  norepinephrine Infusion 0.01 MICROgram(s)/kG/Min IV Continuous <Continuous>  pantoprazole  Injectable 40 milliGRAM(s) IV Push every 12 hours  sodium chloride 0.9%. 1000 milliLiter(s) IV Continuous <Continuous>  vasopressin Infusion 0.04 Unit(s)/Min IV Continuous <Continuous>    ALLERGIES:  Allergies    Cipro (Other)  Geodon (Other)  Haldol (Other)  SULFA (Swelling)  ziprasidone (Unknown)    Intolerances    SOCIAL HISTORY:  Unable to obtain 2/2 patient's current condition    FAMILY HISTORY:  Family history of prostate cancer (Sibling)  Family history of breast cancer (Sibling)  Family history of pancreatic cancer (Sibling)    VITAL SIGNS:  ICU Vital Signs Last 24 Hrs  T(C): 37.4 (2018 14:00), Max: 40.6 (2018 04:45)  T(F): 99.3 (2018 14:00), Max: 105 (2018 04:45)  HR: 72 (2018 16:00) (72 - 128)  BP: 124/91 (2018 11:30) (72/40 - 155/74)  BP(mean): 110 (2018 11:30) (55 - 110)  ABP: 170/70 (2018 16:00) (116/66 - 170/76)  ABP(mean): 100 (2018 16:00) (78 - 102)  RR: 24 (2018 16:00) (20 - 30)  SpO2: 98% (2018 16:00) (94% - 100%)    PHYSICAL EXAM:  Constitutional: ill-appearing intubated and sedated female resting in bed; NAD  Head: NC/AT  Eyes: fixed pupils  ENMT: no rhinorrhea; no sinus fullness; +ETT in place, +OG tube on intermittent suction  Neck: supple; no JVD  Respiratory: CTA B/L  Cardiovascular: +S1/S2, RRR  Gastrointestinal: soft, NT/ND; intact BS; no HSM  Genitourinary: +montes draining asher urine  Extremities: WWP; no clubbing, cyanosis, or edema  Vascular: 2+ radial, DP/PT pulses B/L  Dermatologic: skin warm and dry  Neurologic: sedated, unresponsive to verbal stimuli    LABS:                        11.3   41.6  )-----------( 228      ( 2018 13:59 )             34.8         149<H>  |  112<H>  |  63<H>  ----------------------------<  243<H>  3.2<L>   |  20<L>  |  2.95<H>    Ca    7.8<L>      2018 13:59  Phos  6.4       Mg     2.3         TPro  5.4<L>  /  Alb  3.0<L>  /  TBili  0.7  /  DBili  x   /  AST  547<H>  /  ALT  490<H>  /  AlkPhos  67      PT/INR - ( 2018 13:59 )   PT: 14.9 sec;   INR: 1.33          PTT - ( 2018 13:59 )  PTT:24.8 sec  Urinalysis Basic - ( 2018 13:49 )    Color: Yellow / Appearance: Clear / S.020 / pH: x  Gluc: x / Ketone: NEGATIVE  / Bili: Negative / Urobili: 1.0 E.U./dL   Blood: x / Protein: 100 mg/dL / Nitrite: NEGATIVE   Leuk Esterase: NEGATIVE / RBC: < 5 /HPF / WBC > 10 /HPF   Sq Epi: x / Non Sq Epi: 0-5 /HPF / Bacteria: Present /HPF    MICROBIOLOGY:  Bcx  (from [facility]): pending    RADIOLOGY & ADDITIONAL STUDIES:  < from: CT Head No Cont (18 @ 11:07) >  EXAM:  CT BRAIN                          PROCEDURE DATE:  2018      INTERPRETATION:  PROCEDURE: CT brain without intravenous contrast    INDICATION: CVA    TECHNIQUE: Multiple axial images were obtained at 5 mm intervals from the   skull base to the vertex. The images were reviewed in brain and bone   windows.    COMPARISON: None    FINDINGS: The CT examination demonstrates loss of gray-white matter   differentiation within the left frontal, temporal and parietal lobe with   sulcal effacement. There also is partial sulcal effacement of the left   sylvian fissure. Portions of the left basal ganglia appear intact. There   is mass effect on the left lateral ventricle and frontal horn. These   findings are compatible with acute/subacute ischemia along the left FANNY   and MCA territories. There is no intracranial hemorrhage.    The bony windows demonstrates no fractures the patient is status post   previous sinonasal surgery. There is mucosal thickening with fluid level   within the right maxillary and sphenoid sinuses as well as residual   posterior ethmoid sinus. The mastoid air cells are well aerated.    IMPRESSION: Acute/subacute infarcts involving the left frontal, temporal   and parietal lobes along the left ACAand MCA territories. No   intracranial hemorrhage.    "Thank you for the opportunity to participate in the care of this   patient."    JEN BARRON M.D., ATTENDING RADIOLOGIST  This document has been electronically signed. 2018 11:32AM    < end of copied text >  < from: CT Chest No Cont (18 @ 11:08) >  EXAM:  CT CHEST                          EXAM:  CT ABDOMEN AND PELVIS                          PROCEDURE DATE:  2018        INTERPRETATION:  CT of the CHEST, ABDOMEN and PELVIS without intravenous   contrast dated 2018 9:23 AM    INDICATION: CVA. Patient with hemorrhage.    TECHNIQUE: CT of the chest, abdomen and pelvis was performed without oral   or intravenous contrast. Axial, sagittal and coronal images were produced   and reviewed.    PRIOR STUDIES: None.    FINDINGS: The major thoracic vessels are normal in appearance. The heart   is normal in size.  Mild coronary artery calcification. No pericardial   effusion is seen.  No mediastinal, hilar or axillary lymphadenopathy is   seen. Thyroid appears atrophic.    Endotracheal tube is noted above the omaira. Patchy peribronchovascular   consolidations are noted in both lower lobes. Scattered patchy ground   glass opacities seen in both upper lobes. No pleural effusions are seen.    Images of the upper abdomen demonstrate hepatic steatosis.   Gallbladder   demonstrates vicarious excretion of contrast material.  The pancreas is   normal in appearance.  No splenic abnormalities are seen.    The adrenal glands are unremarkable. Bilateral prominence of the renal   parenchyma with patchy contrast retention. 2.3 cm exophytic cyst   midportion left kidney. Additional subcentimeter lesion mid left kidney,   too small to characterize.    No aortic aneurysm is seen. No lymphadenopathy is seen.     Evaluation of thebowel demonstrates enteric tube with distal tip within   the stomach. Calcific densities seen within a sigmoid diverticulum,   likely retained contrast material. No ascites is seen.     No aortic aneurysm. Bilateral common iliac grafts are noted. Left femoral   venous access line is noted with tip in the proximal external iliac.    Images of the pelvis demonstrate The uterus and adnexae are normal in   appearance.  Montes catheter is noted within the bladder. Contrast   material within the bladder.     Evaluation of the osseous structures demonstrates lumbar levoscoliosis   with severe degenerative changes. Few calcific densities are seen along   the left biceps sheath, compatible with synovial osteochondromatosis.   Possible old healed left proximal humeral fracture. Right gluteal   calcific tendinitis.    Partially visualized large right groin hematoma. Subcutaneous edema right   hip.    IMPRESSION:  Patchy peribronchovascular consolidations are seen in both lower lobes,   as well as patchy groundglass opacities in both upper lobes. Differential   diagnosis includes inflammatory etiologies (i.e. granulomatosis with   polyangiitis, organizing pneumonia, sarcoid) or infectious (i.e.   multifocal pneumonia).    Partially visualized large right groin hematoma.    Prominent bilateral renal parenchyma with patchy contrast retention.   Findings could represent acute tubular necrosis/contrast induced   nephrotoxicity, less likely renal infarction or pyelonephritis. Correlate   clinically.    Hepatic stenosis.    "Thank you for the opportunity to participate in the care of this   patient."    COCA FRANCISCO J CASALDUC M.D., RADIOLOGY RESIDENT  This document has been electronically signed.  QUIRINO DAVILA M.D., ATTENDING RADIOLOGIST  This document has been electronically signed. 2018  5:18PM    < end of copied text >

## 2018-02-28 NOTE — CONSULT NOTE ADULT - SUBJECTIVE AND OBJECTIVE BOX
Stroke Consult Note    HPI:    PAST MEDICAL & SURGICAL HISTORY:  Depression  Anxiety  Schizoaffective disorder  Fibromyalgia  Diabetes mellitus  COPD (chronic obstructive pulmonary disease)  Bipolar disorder  Hypertension  PVD (peripheral vascular disease)  Polyp of sinus  Ovarian cyst, bilateral  S/P tonsillectomy      MEDICATIONS  (STANDING):  chlorhexidine 0.12% Liquid 15 milliLiter(s) Swish and Spit two times a day  dextrose 5%. 1000 milliLiter(s) (50 mL/Hr) IV Continuous <Continuous>  dextrose 50% Injectable 12.5 Gram(s) IV Push once  dextrose 50% Injectable 25 Gram(s) IV Push once  dextrose 50% Injectable 25 Gram(s) IV Push once  insulin Infusion 3 Unit(s)/Hr (3 mL/Hr) IV Continuous <Continuous>  norepinephrine Infusion 0.01 MICROgram(s)/kG/Min (0.836 mL/Hr) IV Continuous <Continuous>  pantoprazole  Injectable 40 milliGRAM(s) IV Push every 12 hours  piperacillin/tazobactam IVPB. 2.25 Gram(s) IV Intermittent every 6 hours  sodium chloride 0.9%. 1000 milliLiter(s) (100 mL/Hr) IV Continuous <Continuous>  vasopressin Infusion 0.04 Unit(s)/Min (2.4 mL/Hr) IV Continuous <Continuous>    MEDICATIONS  (PRN):  dextrose Gel 1 Dose(s) Oral once PRN Blood Glucose LESS THAN 70 milliGRAM(s)/deciliter  glucagon  Injectable 1 milliGRAM(s) IntraMuscular once PRN Glucose LESS THAN 70 milligrams/deciliter      Allergies    Cipro (Other)  Geodon (Other)  Haldol (Other)  SULFA (Swelling)  ziprasidone (Unknown)    Intolerances        FAMILY HISTORY:  Family history of prostate cancer (Sibling)  Family history of breast cancer (Sibling)  Family history of pancreatic cancer (Sibling)      Social History:    Review of Systems:  Constitutional: No fever, weight loss or fatigue  Eyes: No eye pain, visual disturbances, or discharge  ENMT:  No difficulty hearing, tinnitus, vertigo; No sinus or throat pain  Neck: No pain or stiffness  Respiratory: No cough, wheezing, chills or hemoptysis  Cardiovascular: No chest pain, palpitations, shortness of breath, dizziness or leg swelling  Gastrointestinal: No abdominal pain. No nausea, vomiting or hematemesis; No diarrhea or constipation. Nohematochezia.  Genitourinary: No dysuria, frequency, hematuria or incontinence  Neurological: As per HPI  Skin: No itching, burning, rashes or lesions   Endocrine: No heat or cold intolerance; No hair loss  Musculoskeletal: No joint pain or swelling; No muscle, back or extremity pain  Psychiatric: No depression, anxiety, mood swings or difficulty sleeping  Heme/Lymph: No easy bruising or bleeding gums    REVIEW OF SYSTEMS:  As per HPI, otherwise negative for Constitutional, Eyes, Ears/Nose/Mouth/Throat, Neck, Cardiovascular, Respiratory, Gastrointestinal, Genitourinary, Skin, Endocrine, Musculoskeletal, Psychiatric, and Hematologic/Lymphatic.    Vital Signs Last 24 Hrs  T(C): 37.4 (2018 14:00), Max: 40.6 (2018 04:45)  T(F): 99.3 (2018 14:00), Max: 105 (2018 04:45)  HR: 72 (2018 16:00) (72 - 128)  BP: 124/91 (2018 11:30) (72/40 - 155/74)  BP(mean): 110 (2018 11:30) (55 - 110)  RR: 24 (2018 16:00) (20 - 30)  SpO2: 98% (2018 16:00) (94% - 100%)  CAPILLARY BLOOD GLUCOSE      POCT Blood Glucose.: 165 mg/dL (2018 16:56)    CAPILLARY BLOOD GLUCOSE      POCT Blood Glucose.: 165 mg/dL (2018 16:56)  POCT Blood Glucose.: 181 mg/dL (2018 16:02)  POCT Blood Glucose.: 177 mg/dL (2018 14:56)  POCT Blood Glucose.: 185 mg/dL (2018 14:02)  POCT Blood Glucose.: 203 mg/dL (2018 13:07)  POCT Blood Glucose.: 216 mg/dL (2018 11:46)  POCT Blood Glucose.: 280 mg/dL (2018 09:33)      Physical exam:  Gen: No acute distress, well-nourished  CV: carotid arteries- no bruits, reg rate and rhythm, no murmurs  Extremities:     Neurological examination:  Mental status: Awake, alert and oriented x3.  Recent and remote memory intact.  Naming, repetition and comprehension intact.  Attention/concentration intact.  No dysarthria, no aphasia.  Fund of knowledge appropriate.    Cranial nerves: Fundoscopic exam demonstrated no abnormalities, pupils equally round and reactive to light, visual fields full, no nystagmus, extraocular muscles intact, V1 through V3 intact bilaterally and symmetric, face symmetric, hearing intact to finger rub, palate elevation symmetric, tongue was midline, sternocleidomastoid/shoulder shrug strength bilaterally 5/5.    Motor:  Normal bulk and tone, strength 5/5 in bilateral upper and lower extremities.   strength 5/5.  Rapid alternating movements intact and symmetric.   Sensation: Intact to light touch, proprioception, vibration, temperature, pinprick.  No neglect.   Coordination: No dysmetria on finger-to-nose and heel-to-shin.  No clumsiness.  Reflexes: 2+ in upper and lower extremities, absent Babinski bilaterally  Gait: Narrow and steady. No ataxia.  Romberg negative    NIHSS:    Labs:                        11.3   41.6  )-----------( 228      ( 2018 13:59 )             34.8         149<H>  |  112<H>  |  63<H>  ----------------------------<  243<H>  3.2<L>   |  20<L>  |  2.95<H>    Ca    7.8<L>      2018 13:59  Phos  6.4       Mg     2.3         TPro  5.4<L>  /  Alb  3.0<L>  /  TBili  0.7  /  DBili  x   /  AST  547<H>  /  ALT  490<H>  /  AlkPhos  67      PT/INR - ( 2018 13:59 )   PT: 14.9 sec;   INR: 1.33     PTT - ( 2018 13:59 )  PTT:24.8 sec    Urinalysis Basic - ( 2018 13:49 )    Color: Yellow / Appearance: Clear / S.020 / pH: x  Gluc: x / Ketone: NEGATIVE  / Bili: Negative / Urobili: 1.0 E.U./dL   Blood: x / Protein: 100 mg/dL / Nitrite: NEGATIVE   Leuk Esterase: NEGATIVE / RBC: < 5 /HPF / WBC > 10 /HPF   Sq Epi: x / Non Sq Epi: 0-5 /HPF / Bacteria: Present /HPF      Cholesterol, Serum: 154 mg/dL (18 @ 05:53)  HDL Cholesterol, Serum: 24 mg/dL (18 @ 05:53)  Triglycerides, Serum: 130 mg/dL (18 @ 05:53)      Radiology and Additional Studies:    IV-tPA (Y/N):                                   Bolus time:  Reason IV-tPA not given:    Assessment:    Plan:

## 2018-02-28 NOTE — H&P ADULT - NSHPPHYSICALEXAM_GEN_ALL_CORE
.  VITAL SIGNS:  T(C): 39.7 (02-28-18 @ 06:22), Max: 40.6 (02-28-18 @ 04:45)  T(F): 103.4 (02-28-18 @ 06:22), Max: 105 (02-28-18 @ 04:45)  HR: 114 (02-28-18 @ 06:00) (112 - 128)  BP: 141/57 (02-28-18 @ 06:00) (72/40 - 152/69)  BP(mean): 79 (02-28-18 @ 06:00) (55 - 93)  RR: 22 (02-28-18 @ 06:00) (20 - 23)  SpO2: 97% (02-28-18 @ 06:00) (97% - 100%)  Wt(kg): --    PHYSICAL EXAM:    Constitutional: WDWN resting comfortably in bed; NAD  Head: NC/AT  Eyes: PERRL, EOMI, anicteric sclera  ENT: no nasal discharge; uvula midline, no oropharyngeal erythema or exudates; MMM  Neck: supple; no JVD or thyromegaly  Respiratory: CTA B/L; no W/R/R, no retractions  Cardiac: +S1/S2; RRR; no M/R/G; PMI non-displaced  Gastrointestinal: soft, NT/ND; no rebound or guarding; +BSx4  Genitourinary: normal external genitalia  Back: spine midline, no bony tenderness or step-offs; no CVAT B/L  Extremities: WWP, no clubbing or cyanosis; no peripheral edema  Musculoskeletal: NROM x4; no joint swelling, tenderness or erythema  Vascular: 2+ radial, femoral, DP/PT pulses B/L  Dermatologic: skin warm, dry and intact; no rashes, wounds, or scars  Lymphatic: no submandibular or cervical LAD  Neurologic: AAOx3; CNII-XII grossly intact; no focal deficits  Psychiatric: affect and characteristics of appearance, verbalizations, behaviors are appropriate .  VITAL SIGNS:  T(F): 103.4 (02-28-18 @ 06:22), Max: 105 (02-28-18 @ 04:45)  HR: 124 (02-28-18 @ 07:00) (112 - 128)  BP: 89/65 (02-28-18 @ 07:00) (72/40 - 152/69)  BP(mean): 71 (02-28-18 @ 07:00) (55 - 93)  RR: 29 (02-28-18 @ 07:00) (20 - 29)  SpO2: 97% (02-28-18 @ 07:00) (97% - 100%)    PHYSICAL EXAM:    Constitutional: Intubated, non-responsive, intubated and sedated   HEENT: NC/AT, pupils 2mm bilaterally non reactive with R gaze deviation, (-) corneal.  Neck: supple; no JVD   Respiratory: VC/AC, clear to ausculation,  Cardiac: +S1/S2; Regular rate, tachycardic  Gastrointestinal: protuberant, soft, NT/ND;  Extremities: cool extremities, no clubbing or cyanosis; no peripheral edema  Vascular: 2+ radial, femoral, DP  Neurologic: intubated, not sedated, non responsive to verbal or noxious stimuli, 2mm pupils bilaterally, min reactive

## 2018-02-28 NOTE — H&P ADULT - PMH
Anxiety    Bipolar disorder    COPD (chronic obstructive pulmonary disease)    Depression    Diabetes mellitus    Fibromyalgia    Hypertension    PVD (peripheral vascular disease)    Schizoaffective disorder

## 2018-02-28 NOTE — PROGRESS NOTE ADULT - SUBJECTIVE AND OBJECTIVE BOX
INTERVAL HPI/OVERNIGHT EVENTS: Admitted overnight as a transfer from [facility]. Already intubated on arrival.     SUBJECTIVE: Patient seen and examined at bedside. Patient intubated, sedated and unarousable. Unable to obtain ROS.     OBJECTIVE:    VITAL SIGNS:  ICU Vital Signs Last 24 Hrs  T(C): 37.4 (2018 14:00), Max: 40.6 (2018 04:45)  T(F): 99.3 (:00), Max: 105 (2018 04:45)  HR: 82 (:) (82 - 128)  BP: 124/91 (2018 11:30) (72/40 - 155/74)  BP(mean): 110 (2018 11:30) (55 - 110)  ABP: 148/60 (2018 14:00) (116/66 - 170/76)  ABP(mean): 86 (:) (78 - 102)  RR: 24 (:) (20 - 30)  SpO2: 95% (:) (95% - 100%)    Mode: AC/ CMV (Assist Control/ Continuous Mandatory Ventilation), RR (machine): 14, TV (machine): 490, FiO2: 40, PEEP: 5, ITime: 1, MAP: 13, PIP: 25    02- @ 07:01  -  - @ 07:00  --------------------------------------------------------  IN: 2872.5 mL / OUT: 495 mL / NET: 2377.5 mL      CAPILLARY BLOOD GLUCOSE      POCT Blood Glucose.: 185 mg/dL (2018 14:02)      PHYSICAL EXAM:      Constitutional: intubated and sedated, unarousable to noxious stimuli  	HEENT: NC/AT, pupils 2mm bilaterally non reactive with R gaze deviation, (+) corneal reflexes b/l  	Neck: supple; no JVD   	Respiratory: VC/AC, clear to ausculation b/l  	Cardiac: +S1/S2; RRR, no m/r/g appreciated on auscultation  	Gastrointestinal: protuberant, obese abdomen, soft, NT/ND  	Extremities: cool extremities, no clubbing or cyanosis; no peripheral edema, large hematoma along R trunk/hip/RLE (lateral aspect of thigh)  	Vascular: 2+ radial, femoral, DP    Neurologic: intubated and sedated, non responsive to verbal or noxious stimuli, 2mm pupils bilaterally, minimally reactive; + dolls eyes    MEDICATIONS:  MEDICATIONS  (STANDING):  chlorhexidine 0.12% Liquid 15 milliLiter(s) Swish and Spit two times a day  dextrose 5%. 1000 milliLiter(s) (50 mL/Hr) IV Continuous <Continuous>  dextrose 50% Injectable 12.5 Gram(s) IV Push once  dextrose 50% Injectable 25 Gram(s) IV Push once  dextrose 50% Injectable 25 Gram(s) IV Push once  insulin Infusion 3 Unit(s)/Hr (3 mL/Hr) IV Continuous <Continuous>  norepinephrine Infusion 0.01 MICROgram(s)/kG/Min (0.836 mL/Hr) IV Continuous <Continuous>  pantoprazole  Injectable 40 milliGRAM(s) IV Push every 12 hours  phytonadione  IVPB 9 milliGRAM(s) IV Intermittent once  piperacillin/tazobactam IVPB. 3.375 Gram(s) IV Intermittent every 6 hours  sodium chloride 0.9%. 1000 milliLiter(s) (100 mL/Hr) IV Continuous <Continuous>  vasopressin Infusion 0.04 Unit(s)/Min (2.4 mL/Hr) IV Continuous <Continuous>    MEDICATIONS  (PRN):  dextrose Gel 1 Dose(s) Oral once PRN Blood Glucose LESS THAN 70 milliGRAM(s)/deciliter  glucagon  Injectable 1 milliGRAM(s) IntraMuscular once PRN Glucose LESS THAN 70 milligrams/deciliter      ALLERGIES:  Allergies    Cipro (Other)  Geodon (Other)  Haldol (Other)  SULFA (Swelling)  ziprasidone (Unknown)    Intolerances        LABS:                        11.3   41.6  )-----------( 228      ( 2018 13:59 )             34.8         145  |  106  |  64<H>  ----------------------------<  389<H>  4.3   |  18<L>  |  3.38<H>    Ca    8.5      2018 08:46  Phos  6.4       Mg     2.3         TPro  5.6<L>  /  Alb  3.0<L>  /  TBili  0.7  /  DBili  x   /  AST  577<H>  /  ALT  428<H>  /  AlkPhos  65      PT/INR - ( 2018 13:59 )   PT: 14.9 sec;   INR: 1.33          PTT - ( 2018 13:59 )  PTT:24.8 sec  Urinalysis Basic - ( 2018 13:49 )    Color: Yellow / Appearance: Clear / S.020 / pH: x  Gluc: x / Ketone: NEGATIVE  / Bili: Negative / Urobili: 1.0 E.U./dL   Blood: x / Protein: 100 mg/dL / Nitrite: NEGATIVE   Leuk Esterase: NEGATIVE / RBC: < 5 /HPF / WBC > 10 /HPF   Sq Epi: x / Non Sq Epi: 0-5 /HPF / Bacteria: Present /HPF        RADIOLOGY & ADDITIONAL TESTS: Reviewed.

## 2018-02-28 NOTE — PROVIDER CONTACT NOTE (MEDICATION) - RECOMMENDATIONS
Per hyperglycemia protocol, stop insulin drip & administer 1 amp of D50.
Consider transitioning pt to a sliding scale and discontinuing insulin drip

## 2018-02-28 NOTE — PROGRESS NOTE ADULT - ASSESSMENT
62Y F with PMH Bipolar Disorder, Schizophrenia, depression, CAD, COPD, HTN, HLD, DMII, TIA (03/2017) transfer from [facility] for potential intervention for L ICA occlusion, found to be in septic shock, likely lung source, admitted to MICU for pressor support.    ID  #SEPTIC SHOCK: Patient reportedly febrile (Tm101.3), tachycardic, hypotensive requiring levophed. Started on Vanc and Zosyn. Unclear source. Given>48h hospitalization consider coverage for hospital acquired bacteria. On admission T 105 (rectal), HR >130s, WBC 46.7, Lactate 4.7 s/p 1L Bolus, Ofirmev. Unclear etiology, consider aspiration PNA in setting of stroke and intubation.    - c/w Vanc and Zosyn (renally dosed)  - f/u vanc trough at 6 pm   - c/w IVF   - Blood Cultures, CXR, RVP, Lactate- f/u  - Will send sputum cx today  -UA positive, f/u Ucx  - continue to monitor    ENDO  #DM  Patient with known history of DM, Hgb 6.1% on arrival  -FS persistently elevated, >200  -Will initiate insulin gtt today  -Continue to monitor FS QID    #AG Metabolic Acidosis: AG 26 Lactate 4.7 on admission likely 2/2 to septic shock, unclear infectious etiology  - lactate cleared with IVF resuscitation, will continue with IVF      NEURO  #STROKE: patient w/ acute onset R sided weakness at 2/27 16:30 c/b resp distress s/p intubation. CTH CTA with L ICA occlusion. CTH CTA deferred as patient unstable. Pupils fixed, unresponsive to pain, R Gaze on exam. CT Head revealing acute/subacute infarcts involving the left frontal, temporal and parietal lobes along the left FANNY and MCA territories. No ICH  - Echo with doppler  - Lipid Panel, A1c, TSH, ECG  - OT/PT when extubated/indicated  - VEEG today    GI  #Coffee Ground Emesis  Sump pump suctioning approx. 200-400 cc on arrival, c/f GI bleed.  -GI consulted, appreciate recs  -Hgb stable, continue to closely monitor H/H  -c/w Protonix 40 mg IVP BID  -transfuse as needed, goal >8    HEME  #Coagulopathy  Patient with elevated INR on admission, unclear etiology  -will administer 2 u FFP today as well as IV Vit K (1 mg test dose, followed by 9 mg therapeutic dose)  -Continue to trend coags    PULM  #Acute Respiratory Failure: s/p intubation for reported desaturation during rapid response for R sided weakness. Per discharge, ?LLL consolidation. Currently on AC/VC  - CXR - f/u results   - Vent care: HOB>30 degrees, chlorhexidine DVT PPX, frequent suctioning    RENAL  PATSY: BUN: Cr 56:2.73, unclear baseline, Calculated FeNa c/w pre-renal likely 2/2 hypovolemia and septic shock. sCr downtrending with IVF resuscitation.   - c/w IVF resuscitation and continue to trend BMP  - Renal US if persists    PSYCH:  #Schizophrenia: known h/o schizophrenia, with reported agitation at hospital requiring 1:1, now intubated.   - Will obtain collateral from the family regarding history of schizophrenia/home medications    FEN  F: NS @ 100cc/Hr  E: Replete PRN, Goal: K>4, Mg>2  N: NPO  Protonix 40mg Qd, HepSubQ, SCDs    Lines: R femoral TLC in place (placed at [facility]), will remove today and place a RIJ    FULL CODE   CONTACT: Spouse [phone] (per chart) 62Y F with PMH Bipolar Disorder, Schizophrenia, depression, CAD, COPD, HTN, HLD, DMII, TIA (03/2017) transfer from [facility] for potential intervention for L ICA occlusion, found to be in septic shock, likely lung source, admitted to MICU for pressor support.    ID  #SEPTIC SHOCK: Patient reportedly febrile (Tm101.3), tachycardic, hypotensive requiring pressor support with levophed. Started on Vanc and Zosyn. Unclear source. Given>48h hospitalization consider coverage for hospital acquired bacteria. On admission T 105 (rectal), HR >130s, WBC 46.7, Lactate 4.7 s/p 1L Bolus, Ofirmev. Unclear etiology, consider aspiration PNA in setting of stroke and intubation.    - Patient still requiring pressor support, will hold off on stress steroids at this time in light of persistently elevated FS as well as concern for possible GI bleed  - c/w Vanc and Zosyn (renally dosed)  - f/u vanc trough at 6 pm   - c/w IVF   - Blood Cultures, CXR, RVP, Lactate- f/u  - Will send sputum cx today  -UA positive, f/u Ucx  - continue to monitor    ENDO  #DM  Patient with known history of DM, Hgb 6.1% on arrival  -FS persistently elevated, >200  -Will initiate insulin gtt today  -Continue to monitor FS QID    #AG Metabolic Acidosis: AG 26 Lactate 4.7 on admission likely 2/2 to septic shock, unclear infectious etiology  - lactate cleared with IVF resuscitation, will continue with IVF      NEURO  #STROKE: patient w/ acute onset R sided weakness at 2/27 16:30 c/b resp distress s/p intubation. CTH CTA with L ICA occlusion. CTH CTA deferred as patient unstable. Pupils fixed, unresponsive to pain, R Gaze on exam. CT Head revealing acute/subacute infarcts involving the left frontal, temporal and parietal lobes along the left FANNY and MCA territories. No ICH  - Echo with doppler  - Lipid Panel, A1c, TSH, ECG  - OT/PT when extubated/indicated  - VEEG today    GI  #Coffee Ground Emesis  Sump pump suctioning approx. 200-400 cc on arrival, c/f GI bleed.  -GI consulted, appreciate recs  -Hgb stable, continue to closely monitor H/H  -c/w Protonix 40 mg IVP BID  -transfuse as needed, goal >8    HEME  #Coagulopathy  Patient with elevated INR on admission, unclear etiology  -will administer 2 u FFP today as well as IV Vit K (1 mg test dose, followed by 9 mg therapeutic dose)  -Continue to trend coags    PULM  #Acute Respiratory Failure: s/p intubation for reported desaturation during rapid response for R sided weakness. Per discharge, ?LLL consolidation. Currently on AC/VC  - CXR - f/u results   - Vent care: HOB>30 degrees, chlorhexidine DVT PPX, frequent suctioning    RENAL  PATSY: BUN: Cr 56:2.73, unclear baseline, Calculated FeNa c/w pre-renal likely 2/2 hypovolemia and septic shock. sCr downtrending with IVF resuscitation.   - c/w IVF resuscitation and continue to trend BMP  - Renal US if persists    PSYCH:  #Schizophrenia: known h/o schizophrenia, with reported agitation at hospital requiring 1:1, now intubated.   - Will obtain collateral from the family regarding history of schizophrenia/home medications    FEN  F: NS @ 100cc/Hr  E: Replete PRN, Goal: K>4, Mg>2  N: NPO  Protonix 40mg Qd, HepSubQ, SCDs    Lines: R femoral TLC in place (placed at [facility]), will remove today and place a RIJ    FULL CODE   CONTACT: Spouse [phone] (per chart)

## 2018-02-28 NOTE — CONSULT NOTE ADULT - ASSESSMENT
62yr old F with PMHx significant for Bipolar Disorder, Schizophrenia, depression, CAD, COPD, HTN, HLD, DMII, TIA (03/2017) transferred from [facility] for evaluation of R sided weakness.   GI consulted for hematemesis    # Hematemesis -  - coffee grounds noted after passage of OGT  - now having more bilious appearing fluid via OGT  - ddx - stress ulcers, gastritis, PUD, dieulafoy lesion, esophagitis  - PPI BID vs IVinfusion  - Hgb did drop but this could be due to IVF hydration for Rx of initial septic shock or re-equilibration from dehydration at initial presentation  - trend Hgb  - patient is critically ill at this time and it is unlikely endoscopic procedures will provide any additional diagnostic information as to her overall picture - and could potentially aggravate her neurologic status  - will hold off on endoscopy at this time  - continue excellent MICU care    Discussed with attending Dr Wu  GI following

## 2018-03-01 LAB
ALBUMIN SERPL ELPH-MCNC: 2.8 G/DL — LOW (ref 3.3–5)
ALBUMIN SERPL ELPH-MCNC: 3 G/DL — LOW (ref 3.3–5)
ALBUMIN SERPL ELPH-MCNC: 3.1 G/DL — LOW (ref 3.3–5)
ALBUMIN SERPL ELPH-MCNC: 3.2 G/DL — LOW (ref 3.3–5)
ALP SERPL-CCNC: 64 U/L — SIGNIFICANT CHANGE UP (ref 40–120)
ALP SERPL-CCNC: 64 U/L — SIGNIFICANT CHANGE UP (ref 40–120)
ALP SERPL-CCNC: 66 U/L — SIGNIFICANT CHANGE UP (ref 40–120)
ALP SERPL-CCNC: 68 U/L — SIGNIFICANT CHANGE UP (ref 40–120)
ALT FLD-CCNC: 1158 U/L — HIGH (ref 10–45)
ALT FLD-CCNC: 1347 U/L — HIGH (ref 10–45)
ALT FLD-CCNC: 605 U/L — HIGH (ref 10–45)
ALT FLD-CCNC: 738 U/L — HIGH (ref 10–45)
ANION GAP SERPL CALC-SCNC: 13 MMOL/L — SIGNIFICANT CHANGE UP (ref 5–17)
ANION GAP SERPL CALC-SCNC: 14 MMOL/L — SIGNIFICANT CHANGE UP (ref 5–17)
ANION GAP SERPL CALC-SCNC: 16 MMOL/L — SIGNIFICANT CHANGE UP (ref 5–17)
ANION GAP SERPL CALC-SCNC: 17 MMOL/L — SIGNIFICANT CHANGE UP (ref 5–17)
APTT BLD: 24.7 SEC — LOW (ref 27.5–37.4)
AST SERPL-CCNC: 1160 U/L — HIGH (ref 10–40)
AST SERPL-CCNC: 541 U/L — HIGH (ref 10–40)
AST SERPL-CCNC: 643 U/L — HIGH (ref 10–40)
AST SERPL-CCNC: 971 U/L — HIGH (ref 10–40)
BASE EXCESS BLDA CALC-SCNC: -1.6 MMOL/L — SIGNIFICANT CHANGE UP (ref -2–3)
BASE EXCESS BLDA CALC-SCNC: -2 MMOL/L — SIGNIFICANT CHANGE UP (ref -2–3)
BASE EXCESS BLDA CALC-SCNC: -2.2 MMOL/L — LOW (ref -2–3)
BASE EXCESS BLDV CALC-SCNC: -1.3 MMOL/L — SIGNIFICANT CHANGE UP
BILIRUB SERPL-MCNC: 0.7 MG/DL — SIGNIFICANT CHANGE UP (ref 0.2–1.2)
BILIRUB SERPL-MCNC: 0.8 MG/DL — SIGNIFICANT CHANGE UP (ref 0.2–1.2)
BUN SERPL-MCNC: 42 MG/DL — HIGH (ref 7–23)
BUN SERPL-MCNC: 45 MG/DL — HIGH (ref 7–23)
BUN SERPL-MCNC: 53 MG/DL — HIGH (ref 7–23)
BUN SERPL-MCNC: 55 MG/DL — HIGH (ref 7–23)
CALCIUM SERPL-MCNC: 7.6 MG/DL — LOW (ref 8.4–10.5)
CALCIUM SERPL-MCNC: 8 MG/DL — LOW (ref 8.4–10.5)
CHLORIDE SERPL-SCNC: 111 MMOL/L — HIGH (ref 96–108)
CHLORIDE SERPL-SCNC: 116 MMOL/L — HIGH (ref 96–108)
CHLORIDE SERPL-SCNC: 117 MMOL/L — HIGH (ref 96–108)
CHLORIDE SERPL-SCNC: 118 MMOL/L — HIGH (ref 96–108)
CO2 SERPL-SCNC: 20 MMOL/L — LOW (ref 22–31)
CO2 SERPL-SCNC: 21 MMOL/L — LOW (ref 22–31)
CREAT SERPL-MCNC: 1.53 MG/DL — HIGH (ref 0.5–1.3)
CREAT SERPL-MCNC: 1.61 MG/DL — HIGH (ref 0.5–1.3)
CREAT SERPL-MCNC: 1.89 MG/DL — HIGH (ref 0.5–1.3)
CREAT SERPL-MCNC: 2.11 MG/DL — HIGH (ref 0.5–1.3)
GAS PNL BLDA: SIGNIFICANT CHANGE UP
GAS PNL BLDV: SIGNIFICANT CHANGE UP
GAS PNL BLDV: SIGNIFICANT CHANGE UP
GLUCOSE BLDC GLUCOMTR-MCNC: 103 MG/DL — HIGH (ref 70–99)
GLUCOSE BLDC GLUCOMTR-MCNC: 105 MG/DL — HIGH (ref 70–99)
GLUCOSE BLDC GLUCOMTR-MCNC: 111 MG/DL — HIGH (ref 70–99)
GLUCOSE BLDC GLUCOMTR-MCNC: 113 MG/DL — HIGH (ref 70–99)
GLUCOSE BLDC GLUCOMTR-MCNC: 132 MG/DL — HIGH (ref 70–99)
GLUCOSE BLDC GLUCOMTR-MCNC: 134 MG/DL — HIGH (ref 70–99)
GLUCOSE BLDC GLUCOMTR-MCNC: 145 MG/DL — HIGH (ref 70–99)
GLUCOSE BLDC GLUCOMTR-MCNC: 147 MG/DL — HIGH (ref 70–99)
GLUCOSE BLDC GLUCOMTR-MCNC: 147 MG/DL — HIGH (ref 70–99)
GLUCOSE BLDC GLUCOMTR-MCNC: 157 MG/DL — HIGH (ref 70–99)
GLUCOSE BLDC GLUCOMTR-MCNC: 162 MG/DL — HIGH (ref 70–99)
GLUCOSE BLDC GLUCOMTR-MCNC: 185 MG/DL — HIGH (ref 70–99)
GLUCOSE BLDC GLUCOMTR-MCNC: 90 MG/DL — SIGNIFICANT CHANGE UP (ref 70–99)
GLUCOSE SERPL-MCNC: 160 MG/DL — HIGH (ref 70–99)
GLUCOSE SERPL-MCNC: 167 MG/DL — HIGH (ref 70–99)
GLUCOSE SERPL-MCNC: 171 MG/DL — HIGH (ref 70–99)
GLUCOSE SERPL-MCNC: 200 MG/DL — HIGH (ref 70–99)
GRAM STN FLD: SIGNIFICANT CHANGE UP
HBA1C BLD-MCNC: 6.3 % — HIGH (ref 4–5.6)
HCO3 BLDA-SCNC: 20 MMOL/L — LOW (ref 21–28)
HCO3 BLDA-SCNC: 21 MMOL/L — SIGNIFICANT CHANGE UP (ref 21–28)
HCO3 BLDA-SCNC: 21 MMOL/L — SIGNIFICANT CHANGE UP (ref 21–28)
HCO3 BLDV-SCNC: 23 MMOL/L — SIGNIFICANT CHANGE UP (ref 20–27)
HCT VFR BLD CALC: 30.1 % — LOW (ref 34.5–45)
HCT VFR BLD CALC: 30.3 % — LOW (ref 34.5–45)
HCT VFR BLD CALC: 31 % — LOW (ref 34.5–45)
HGB BLD-MCNC: 10 G/DL — LOW (ref 11.5–15.5)
HGB BLD-MCNC: 9.7 G/DL — LOW (ref 11.5–15.5)
HGB BLD-MCNC: 9.7 G/DL — LOW (ref 11.5–15.5)
INR BLD: 1.27 — HIGH (ref 0.88–1.16)
LACTATE SERPL-SCNC: 1.6 MMOL/L — SIGNIFICANT CHANGE UP (ref 0.5–2)
LACTATE SERPL-SCNC: 1.8 MMOL/L — SIGNIFICANT CHANGE UP (ref 0.5–2)
LYMPHOCYTES # BLD AUTO: 7 % — LOW (ref 13–44)
MAGNESIUM SERPL-MCNC: 2.1 MG/DL — SIGNIFICANT CHANGE UP (ref 1.6–2.6)
MAGNESIUM SERPL-MCNC: 2.3 MG/DL — SIGNIFICANT CHANGE UP (ref 1.6–2.6)
MAGNESIUM SERPL-MCNC: 2.3 MG/DL — SIGNIFICANT CHANGE UP (ref 1.6–2.6)
MCHC RBC-ENTMCNC: 29 PG — SIGNIFICANT CHANGE UP (ref 27–34)
MCHC RBC-ENTMCNC: 29 PG — SIGNIFICANT CHANGE UP (ref 27–34)
MCHC RBC-ENTMCNC: 29.1 PG — SIGNIFICANT CHANGE UP (ref 27–34)
MCHC RBC-ENTMCNC: 32 G/DL — SIGNIFICANT CHANGE UP (ref 32–36)
MCHC RBC-ENTMCNC: 32.2 G/DL — SIGNIFICANT CHANGE UP (ref 32–36)
MCHC RBC-ENTMCNC: 32.3 G/DL — SIGNIFICANT CHANGE UP (ref 32–36)
MCV RBC AUTO: 90.1 FL — SIGNIFICANT CHANGE UP (ref 80–100)
MCV RBC AUTO: 90.1 FL — SIGNIFICANT CHANGE UP (ref 80–100)
MCV RBC AUTO: 90.4 FL — SIGNIFICANT CHANGE UP (ref 80–100)
MONOCYTES NFR BLD AUTO: 2 % — SIGNIFICANT CHANGE UP (ref 2–14)
NEUTROPHILS NFR BLD AUTO: 89.9 % — HIGH (ref 43–77)
NEUTROPHILS NFR BLD AUTO: 90 % — HIGH (ref 43–77)
NEUTS BAND # BLD: 1 % — SIGNIFICANT CHANGE UP
OSMOLALITY SERPL: 329 MOSM/KG — HIGH (ref 280–301)
OSMOLALITY SERPL: 334 MOSM/KG — HIGH (ref 280–301)
PCO2 BLDA: 28 MMHG — LOW (ref 32–45)
PCO2 BLDA: 29 MMHG — LOW (ref 32–45)
PCO2 BLDA: 30 MMHG — LOW (ref 32–45)
PCO2 BLDV: 38 MMHG — LOW (ref 41–51)
PH BLDA: 7.46 — HIGH (ref 7.35–7.45)
PH BLDA: 7.48 — HIGH (ref 7.35–7.45)
PH BLDA: 7.48 — HIGH (ref 7.35–7.45)
PH BLDV: 7.4 — SIGNIFICANT CHANGE UP (ref 7.32–7.43)
PHOSPHATE SERPL-MCNC: 3.3 MG/DL — SIGNIFICANT CHANGE UP (ref 2.5–4.5)
PHOSPHATE SERPL-MCNC: 4 MG/DL — SIGNIFICANT CHANGE UP (ref 2.5–4.5)
PLAT MORPH BLD: NORMAL — SIGNIFICANT CHANGE UP
PLATELET # BLD AUTO: 156 K/UL — SIGNIFICANT CHANGE UP (ref 150–400)
PLATELET # BLD AUTO: 171 K/UL — SIGNIFICANT CHANGE UP (ref 150–400)
PLATELET # BLD AUTO: 174 K/UL — SIGNIFICANT CHANGE UP (ref 150–400)
PLATELET CLUMP BLD QL SMEAR: PRESENT
PO2 BLDA: 107 MMHG — SIGNIFICANT CHANGE UP (ref 83–108)
PO2 BLDA: 114 MMHG — HIGH (ref 83–108)
PO2 BLDA: 97 MMHG — SIGNIFICANT CHANGE UP (ref 83–108)
PO2 BLDV: 28 MMHG — SIGNIFICANT CHANGE UP
POTASSIUM SERPL-MCNC: 3.4 MMOL/L — LOW (ref 3.5–5.3)
POTASSIUM SERPL-MCNC: 4.1 MMOL/L — SIGNIFICANT CHANGE UP (ref 3.5–5.3)
POTASSIUM SERPL-SCNC: 3.4 MMOL/L — LOW (ref 3.5–5.3)
POTASSIUM SERPL-SCNC: 4.1 MMOL/L — SIGNIFICANT CHANGE UP (ref 3.5–5.3)
PROT SERPL-MCNC: 5.8 G/DL — LOW (ref 6–8.3)
PROT SERPL-MCNC: 6 G/DL — SIGNIFICANT CHANGE UP (ref 6–8.3)
PROT SERPL-MCNC: 6.1 G/DL — SIGNIFICANT CHANGE UP (ref 6–8.3)
PROT SERPL-MCNC: 6.2 G/DL — SIGNIFICANT CHANGE UP (ref 6–8.3)
PROTHROM AB SERPL-ACNC: 14.2 SEC — HIGH (ref 9.8–12.7)
RBC # BLD: 3.34 M/UL — LOW (ref 3.8–5.2)
RBC # BLD: 3.35 M/UL — LOW (ref 3.8–5.2)
RBC # BLD: 3.44 M/UL — LOW (ref 3.8–5.2)
RBC # FLD: 16.1 % — SIGNIFICANT CHANGE UP (ref 10.3–16.9)
RBC # FLD: 16.3 % — SIGNIFICANT CHANGE UP (ref 10.3–16.9)
RBC # FLD: 16.4 % — SIGNIFICANT CHANGE UP (ref 10.3–16.9)
RBC BLD AUTO: NORMAL — SIGNIFICANT CHANGE UP
SAO2 % BLDA: 97 % — SIGNIFICANT CHANGE UP (ref 95–100)
SAO2 % BLDA: 98 % — SIGNIFICANT CHANGE UP (ref 95–100)
SAO2 % BLDA: 98 % — SIGNIFICANT CHANGE UP (ref 95–100)
SAO2 % BLDV: 51 % — SIGNIFICANT CHANGE UP
SODIUM SERPL-SCNC: 148 MMOL/L — HIGH (ref 135–145)
SODIUM SERPL-SCNC: 151 MMOL/L — HIGH (ref 135–145)
SODIUM SERPL-SCNC: 151 MMOL/L — HIGH (ref 135–145)
SODIUM SERPL-SCNC: 155 MMOL/L — HIGH (ref 135–145)
SPECIMEN SOURCE: SIGNIFICANT CHANGE UP
VANCOMYCIN TROUGH SERPL-MCNC: 9 UG/ML — LOW (ref 10–20)
WBC # BLD: 29.6 K/UL — HIGH (ref 3.8–10.5)
WBC # BLD: 31.6 K/UL — HIGH (ref 3.8–10.5)
WBC # BLD: 34.2 K/UL — HIGH (ref 3.8–10.5)
WBC # FLD AUTO: 29.6 K/UL — HIGH (ref 3.8–10.5)
WBC # FLD AUTO: 31.6 K/UL — HIGH (ref 3.8–10.5)
WBC # FLD AUTO: 34.2 K/UL — HIGH (ref 3.8–10.5)

## 2018-03-01 PROCEDURE — 71045 X-RAY EXAM CHEST 1 VIEW: CPT | Mod: 26,76

## 2018-03-01 PROCEDURE — 99233 SBSQ HOSP IP/OBS HIGH 50: CPT

## 2018-03-01 PROCEDURE — 74018 RADEX ABDOMEN 1 VIEW: CPT | Mod: 26,59

## 2018-03-01 PROCEDURE — 95812 EEG 41-60 MINUTES: CPT | Mod: 26

## 2018-03-01 PROCEDURE — 99291 CRITICAL CARE FIRST HOUR: CPT

## 2018-03-01 PROCEDURE — 99233 SBSQ HOSP IP/OBS HIGH 50: CPT | Mod: GC

## 2018-03-01 RX ORDER — VANCOMYCIN HCL 1 G
1250 VIAL (EA) INTRAVENOUS ONCE
Qty: 0 | Refills: 0 | Status: COMPLETED | OUTPATIENT
Start: 2018-03-01 | End: 2018-03-01

## 2018-03-01 RX ORDER — VANCOMYCIN HCL 1 G
VIAL (EA) INTRAVENOUS
Qty: 0 | Refills: 0 | Status: DISCONTINUED | OUTPATIENT
Start: 2018-03-01 | End: 2018-03-02

## 2018-03-01 RX ORDER — VANCOMYCIN HCL 1 G
1250 VIAL (EA) INTRAVENOUS EVERY 24 HOURS
Qty: 0 | Refills: 0 | Status: DISCONTINUED | OUTPATIENT
Start: 2018-03-02 | End: 2018-03-02

## 2018-03-01 RX ORDER — POTASSIUM CHLORIDE 20 MEQ
20 PACKET (EA) ORAL
Qty: 0 | Refills: 0 | Status: COMPLETED | OUTPATIENT
Start: 2018-03-01 | End: 2018-03-01

## 2018-03-01 RX ORDER — MEPERIDINE HYDROCHLORIDE 50 MG/ML
25 INJECTION INTRAMUSCULAR; INTRAVENOUS; SUBCUTANEOUS ONCE
Qty: 0 | Refills: 0 | Status: DISCONTINUED | OUTPATIENT
Start: 2018-03-01 | End: 2018-03-01

## 2018-03-01 RX ORDER — IPRATROPIUM/ALBUTEROL SULFATE 18-103MCG
3 AEROSOL WITH ADAPTER (GRAM) INHALATION EVERY 6 HOURS
Qty: 0 | Refills: 0 | Status: DISCONTINUED | OUTPATIENT
Start: 2018-03-01 | End: 2018-03-01

## 2018-03-01 RX ORDER — MEPERIDINE HYDROCHLORIDE 50 MG/ML
25 INJECTION INTRAMUSCULAR; INTRAVENOUS; SUBCUTANEOUS EVERY 6 HOURS
Qty: 0 | Refills: 0 | Status: DISCONTINUED | OUTPATIENT
Start: 2018-03-01 | End: 2018-03-03

## 2018-03-01 RX ORDER — IPRATROPIUM/ALBUTEROL SULFATE 18-103MCG
3 AEROSOL WITH ADAPTER (GRAM) INHALATION EVERY 4 HOURS
Qty: 0 | Refills: 0 | Status: DISCONTINUED | OUTPATIENT
Start: 2018-03-01 | End: 2018-03-03

## 2018-03-01 RX ORDER — NOREPINEPHRINE BITARTRATE/D5W 8 MG/250ML
0.01 PLASTIC BAG, INJECTION (ML) INTRAVENOUS
Qty: 8 | Refills: 0 | Status: DISCONTINUED | OUTPATIENT
Start: 2018-03-01 | End: 2018-03-02

## 2018-03-01 RX ORDER — INSULIN LISPRO 100/ML
VIAL (ML) SUBCUTANEOUS EVERY 6 HOURS
Qty: 0 | Refills: 0 | Status: DISCONTINUED | OUTPATIENT
Start: 2018-03-01 | End: 2018-03-02

## 2018-03-01 RX ADMIN — Medication 50 MILLIEQUIVALENT(S): at 18:57

## 2018-03-01 RX ADMIN — PIPERACILLIN AND TAZOBACTAM 200 GRAM(S): 4; .5 INJECTION, POWDER, LYOPHILIZED, FOR SOLUTION INTRAVENOUS at 06:50

## 2018-03-01 RX ADMIN — PANTOPRAZOLE SODIUM 40 MILLIGRAM(S): 20 TABLET, DELAYED RELEASE ORAL at 18:00

## 2018-03-01 RX ADMIN — Medication 0.84 MICROGRAM(S)/KG/MIN: at 04:21

## 2018-03-01 RX ADMIN — Medication 50 MILLIEQUIVALENT(S): at 17:52

## 2018-03-01 RX ADMIN — CHLORHEXIDINE GLUCONATE 15 MILLILITER(S): 213 SOLUTION TOPICAL at 07:10

## 2018-03-01 RX ADMIN — Medication 3 MILLILITER(S): at 18:41

## 2018-03-01 RX ADMIN — Medication 3 MILLILITER(S): at 22:40

## 2018-03-01 RX ADMIN — MEPERIDINE HYDROCHLORIDE 25 MILLIGRAM(S): 50 INJECTION INTRAMUSCULAR; INTRAVENOUS; SUBCUTANEOUS at 17:25

## 2018-03-01 RX ADMIN — Medication 50 MILLIEQUIVALENT(S): at 03:56

## 2018-03-01 RX ADMIN — Medication 50 MILLIEQUIVALENT(S): at 00:34

## 2018-03-01 RX ADMIN — Medication 50 MILLIEQUIVALENT(S): at 21:02

## 2018-03-01 RX ADMIN — Medication 166.67 MILLIGRAM(S): at 12:38

## 2018-03-01 RX ADMIN — CHLORHEXIDINE GLUCONATE 15 MILLILITER(S): 213 SOLUTION TOPICAL at 17:51

## 2018-03-01 RX ADMIN — PANTOPRAZOLE SODIUM 40 MILLIGRAM(S): 20 TABLET, DELAYED RELEASE ORAL at 07:10

## 2018-03-01 RX ADMIN — PIPERACILLIN AND TAZOBACTAM 200 GRAM(S): 4; .5 INJECTION, POWDER, LYOPHILIZED, FOR SOLUTION INTRAVENOUS at 18:00

## 2018-03-01 RX ADMIN — Medication 3 MILLILITER(S): at 13:20

## 2018-03-01 RX ADMIN — Medication 50 MILLIEQUIVALENT(S): at 01:43

## 2018-03-01 RX ADMIN — PIPERACILLIN AND TAZOBACTAM 200 GRAM(S): 4; .5 INJECTION, POWDER, LYOPHILIZED, FOR SOLUTION INTRAVENOUS at 12:38

## 2018-03-01 NOTE — DIETITIAN INITIAL EVALUATION ADULT. - OTHER INFO
62Y F with PMH Bipolar Disorder, Schizophrenia, depression, CAD, COPD, HTN, HLD, DMII, TIA (03/2017) transfer from [facility] for potential intervention for L ICA occlusion, found to be in septic shock, likely lung source, admitted to MICU for pressor support. Currently intubated & NPO. Trickle feeds to begin today. MAP 96.

## 2018-03-01 NOTE — DIETITIAN INITIAL EVALUATION ADULT. - NS AS NUTRI INTERV ENTERAL NUTRITION
Glucerna 1.2 at 62 mL x 24 hr (route per MD) to provide 1488 mL TV, 1786 kcal, 90 g protein (1.5 g/kg IBW protein), & 1201 mL fluid. Start at 30 mL, increase by 10 mL q4 to goal rate. Monitor for signs of intolerance & maintain aspiration precautions.

## 2018-03-01 NOTE — PROGRESS NOTE ADULT - ASSESSMENT
Ms. Reno is a 63yo F w/ extensive PMH who originally presented 2/18 to [facility] for AMS and found to be severely hyponatremic to 108 (query 2/2 psychiatric medication/psychiatric comorbidities?) and subsequently improved only to later to develop acute respiratory failure requiring intubation and pressors for presumed LLL PNA per [place] documentation where she was started on vancomycin and zosyn. Her [place] course was c/b L ICA occlusion and she was transferred to [facility] for possible endovascular intervention. ID was consulted for w/u septic shock and c/f possible septic emboli.     Suggestions:  - would continue with broad spectrum antibiotics vanc/zosyn for now pending further culture data obtained    We will sign off now, please call back with any questions    Discussed w/ ID attending Dr. Douglas and primary MICU team    Td Contreras, DO PGY-2

## 2018-03-01 NOTE — PROGRESS NOTE ADULT - SUBJECTIVE AND OBJECTIVE BOX
Pt seen and examined at bedside  No new c/o  Had a slight drop in her Hgb overnite - but back to 10  No output reported - no melena, BRBPR, hematemesis - OGT draining bilious material      MEDICATIONS:  MEDICATIONS  (STANDING):  ALBUTerol/ipratropium for Nebulization 3 milliLiter(s) Nebulizer every 6 hours  chlorhexidine 0.12% Liquid 15 milliLiter(s) Swish and Spit two times a day  dextrose 5%. 1000 milliLiter(s) (50 mL/Hr) IV Continuous <Continuous>  dextrose 50% Injectable 12.5 Gram(s) IV Push once  dextrose 50% Injectable 25 Gram(s) IV Push once  dextrose 50% Injectable 25 Gram(s) IV Push once  insulin lispro (HumaLOG) corrective regimen sliding scale   SubCutaneous every 6 hours  norepinephrine Infusion 0.01 MICROgram(s)/kG/Min (0.836 mL/Hr) IV Continuous <Continuous>  pantoprazole  Injectable 40 milliGRAM(s) IV Push every 12 hours  piperacillin/tazobactam IVPB. 3.375 Gram(s) IV Intermittent every 6 hours  vancomycin  IVPB        MEDICATIONS  (PRN):  dextrose Gel 1 Dose(s) Oral once PRN Blood Glucose LESS THAN 70 milliGRAM(s)/deciliter  glucagon  Injectable 1 milliGRAM(s) IntraMuscular once PRN Glucose LESS THAN 70 milligrams/deciliter      Allergies  Cipro (Other)  Geodon (Other)  Haldol (Other)  SULFA (Swelling)  ziprasidone (Unknown)    Intolerances    Vital Signs Last 24 Hrs  T(C): 37.7 (01 Mar 2018 10:00), Max: 38.3 (2018 19:00)  T(F): 99.9 (01 Mar 2018 10:00), Max: 100.9 (2018 19:00)  HR: 96 (01 Mar 2018 13:00) (64 - 118)  BP: 148/80 (01 Mar 2018 03:00) (148/80 - 172/82)  BP(mean): 110 (01 Mar 2018 03:00) (110 - 127)  RR: 29 (01 Mar 2018 13:00) (19 - 38)  SpO2: 99% (01 Mar 2018 13:00) (94% - 100%)     @ 07:01  -  - @ 07:00  --------------------------------------------------------  IN: 3643.2 mL / OUT: 2290 mL / NET: 1353.2 mL     @ 07:01  -   @ 13:33  --------------------------------------------------------  IN: 415 mL / OUT: 460 mL / NET: -45 mL      PHYSICAL EXAM:  GEN - middle aged obese F lying in bed in no distress, sedated and intubated, ill appearing, EEG on scalp  Eyes: fixed pupils  Gastrointestinal: full, obese, soft, BS+, NT, NR, NG , no masses or organs palpated  Extremities: no pitting LE edema  Neurological: sedated, non responsive to noxious stimuli  Skin: intact    LABS:  ABG - ( 01 Mar 2018 06:38 )  pH: 7.46  /  pCO2: 30    /  pO2: 107   / HCO3: 21    / Base Excess: -2.0  /  SaO2: 98        CBC Full  -  ( 01 Mar 2018 06:45 )  WBC Count : 31.6 K/uL  Hemoglobin : 10.0 g/dL  Hematocrit : 31.0 %  Platelet Count - Automated : 171 K/uL  Mean Cell Volume : 90.1 fL  Mean Cell Hemoglobin : 29.1 pg  Mean Cell Hemoglobin Concentration : 32.3 g/dL    151<H>  |  116<H>  |  53<H>  ----------------------------<  171<H>  3.4<L>   |  21<L>  |  1.89<H>    Ca    8.0<L>      01 Mar 2018 06:45  Phos  3.3       Mg     2.3         TPro  6.2  /  Alb  3.2<L>  /  TBili  0.7  /  DBili  x   /  AST  643<H>  /  ALT  738<H>  /  AlkPhos  68      PT/INR - ( 2018 22:07 )   PT: 14.4 sec;   INR: 1.29       PTT - ( 2018 22:07 )  PTT:25.7 sec    Urinalysis Basic - ( 2018 13:49 )  Color: Yellow / Appearance: Clear / S.020 / pH: x  Gluc: x / Ketone: NEGATIVE  / Bili: Negative / Urobili: 1.0 E.U./dL   Blood: x / Protein: 100 mg/dL / Nitrite: NEGATIVE   Leuk Esterase: NEGATIVE / RBC: < 5 /HPF / WBC > 10 /HPF   Sq Epi: x / Non Sq Epi: 0-5 /HPF / Bacteria: Present /HPF          RADIOLOGY & ADDITIONAL STUDIES (The following images were personally reviewed):

## 2018-03-01 NOTE — PROGRESS NOTE ADULT - SUBJECTIVE AND OBJECTIVE BOX
Neurology Stroke Follow Up     Interval History:  Patient seen and examined.  Patient has fever and is on cooling blanket.  vEEG in place - Discussed case with Dr. Carter last night and Dr. Vila this morning - hard to evaluate since so much muscle artifact present.  Blood pressure better now that off Propofol.    Medications:  MEDICATIONS  (STANDING):  ALBUTerol/ipratropium for Nebulization 3 milliLiter(s) Nebulizer every 6 hours  chlorhexidine 0.12% Liquid 15 milliLiter(s) Swish and Spit two times a day  insulin lispro (HumaLOG) corrective regimen sliding scale   SubCutaneous every 6 hours  norepinephrine Infusion 0.01 MICROgram(s)/kG/Min (0.836 mL/Hr) IV Continuous <Continuous>  pantoprazole  Injectable 40 milliGRAM(s) IV Push every 12 hours  piperacillin/tazobactam IVPB. 3.375 Gram(s) IV Intermittent every 6 hours  vancomycin  IVPB        Allergies    Cipro (Other)  Geodon (Other)  Haldol (Other)  SULFA (Swelling)  ziprasidone (Unknown)    Exam:  Vital Signs Last 24 Hrs  T(C): 37.7 (01 Mar 2018 10:00), Max: 38.3 (2018 19:00)  T(F): 99.9 (01 Mar 2018 10:00), Max: 100.9 (2018 19:00)  HR: 96 (01 Mar 2018 13:00) (64 - 118)  BP: 148/80 (01 Mar 2018 03:00) (148/80 - 172/82)  BP(mean): 110 (01 Mar 2018 03:00) (110 - 127)  RR: 29 (01 Mar 2018 13:00) (19 - 38)  SpO2: 99% (01 Mar 2018 13:00) (95% - 100%)    Gen: Lying in bed, NAD  Neuro:  Mental status: no sedation, opens eyes with activity in the room, not following commands       Cranial nerves: Left gaze preference but moves almost all the way to right on oculocephalic reflex, Pupils equally round and reactive to light, corneal reflex present but decreased on right, brisk on left, unable to assess facial droop due to ETT, + gag reflex    Motor:  No spontaneous movement     Sensation: Decreased response to pain to right than left.   Coordination: unable to perform  Reflexes: extensor Babinski bilaterally  Gait: deferred    Labs:                        10.0   31.6  )-----------( 171      ( 01 Mar 2018 06:45 )             31.0     03    151<H>  |  116<H>  |  53<H>  ----------------------------<  171<H>  3.4<L>   |  21<L>  |  1.89<H>    Ca    8.0<L>      01 Mar 2018 06:45  Phos  3.3       Mg     2.3         LIVER FUNCTIONS - ( 01 Mar 2018 06:45 )  Alb: 3.2 g/dL / Pro: 6.2 g/dL / ALK PHOS: 68 U/L / ALT: 738 U/L / AST: 643 U/L / GGT: x           PT/INR - ( 2018 22:07 )   PT: 14.4 sec;   INR: 1.29     PTT - ( 2018 22:07 )  PTT:25.7 sec    Urinalysis Basic - ( 2018 13:49 )    Color: Yellow / Appearance: Clear / S.020 / pH: x  Gluc: x / Ketone: NEGATIVE  / Bili: Negative / Urobili: 1.0 E.U./dL   Blood: x / Protein: 100 mg/dL / Nitrite: NEGATIVE   Leuk Esterase: NEGATIVE / RBC: < 5 /HPF / WBC > 10 /HPF   Sq Epi: x / Non Sq Epi: 0-5 /HPF / Bacteria: Present /HPF      Hemoglobin A1C, Whole Blood: 6.3 % ( @ 06:45)  Hemoglobin A1C, Whole Blood: 6.4 % ( @ 22:07)  Cholesterol, Serum: 154 mg/dL ( @ 05:53)  HDL Cholesterol, Serum: 24 mg/dL ( @ 05:53)  Triglycerides, Serum: 130 mg/dL ( @ 05:53)      Radiology:  no new imaging.    Assessment: 62 year-old female with multiple medical problems transferred from [place] for presumed right hemiparesis, though hard to establish since her course is complicated by sepsis.    Plan:  1) r/o Stroke -   - Would prefer CT Head without contrast to follow up her cerebral edema.  - Consider antiplatelet, if no contraindication, with reassessment based on follow up imaging.  - No statin since LFTs elevated.    2) r/o seizure -   - Continue vEEG for now but no antiepileptic since no evidence that she actually had seizure.    3) Toxic encephalopathy due to severe sepsis -   - as per MICU; patient on fluids, antibiotics but still having fevers.    4) DVT prophylaxis - SCDs in place

## 2018-03-01 NOTE — PROGRESS NOTE ADULT - SUBJECTIVE AND OBJECTIVE BOX
INFECTIOUS DISEASE CONSULT PROGRESS NOTE    INTERVAL HPI/OVERNIGHT EVENTS:  reviewed o/n events; pt seen and examined @ approx 1015hrs this AM    ACTIVE ANTIMICROBIALS/ANTIBIOTICS:  piperacillin/tazobactam IVPB. 3.375 Gram(s) IV Intermittent every 6 hours  vancomycin  IVPB        VITAL SIGNS:  ICU Vital Signs Last 24 Hrs  T(C): 38.1 (01 Mar 2018 18:00), Max: 38.3 (2018 19:00)  T(F): 100.6 (01 Mar 2018 18:00), Max: 100.9 (2018 19:00)  HR: 104 (01 Mar 2018 18:00) (64 - 118)  BP: 148/80 (01 Mar 2018 03:00) (148/80 - 172/82)  BP(mean): 110 (01 Mar 2018 03:00) (110 - 127)  ABP: 142/64 (01 Mar 2018 18:00) (142/64 - 178/78)  ABP(mean): 90 (01 Mar 2018 18:00) (86 - 112)  RR: 31 (01 Mar 2018 18:00) (19 - 50)  SpO2: 99% (01 Mar 2018 18:00) (97% - 100%)    PHYSICAL EXAM:  Constitutional: ill-appearing intubated and sedated female resting in bed; NAD  Head: NC/AT  Eyes: fixed pupils  ENMT: no rhinorrhea; no sinus fullness; +ETT in place, +OG tube on intermittent suction  Neck: supple; no JVD; +RIJ TLC  Respiratory: intubated on AC/CMV w/ diffuse rhonchi  Cardiovascular: +S1/S2, RRR  Gastrointestinal: obese and soft, minimally distended  Genitourinary: +montes draining asher urine  Extremities: cool but not cold; no clubbing, cyanosis, or edema  Vascular: 2+ radial, 1+ DP/PT pulses B/L  Dermatologic: skin warm and dry  Neurologic: unresponsive to verbal stimuli    LABS:                        9.7    29.6  )-----------( 156      ( 01 Mar 2018 15:26 )             30.1       03    155<H>  |  118<H>  |  45<H>  ----------------------------<  167<H>  3.4<L>   |  21<L>  |  1.61<H>    Ca    8.0<L>      01 Mar 2018 15:25  Phos  3.3     -  Mg     2.3     -    TPro  6.0  /  Alb  3.1<L>  /  TBili  0.7  /  DBili  x   /  AST  971<H>  /  ALT  1158<H>  /  AlkPhos  66  -    Urinalysis Basic - ( 2018 13:49 )    Color: Yellow / Appearance: Clear / S.020 / pH: x  Gluc: x / Ketone: NEGATIVE  / Bili: Negative / Urobili: 1.0 E.U./dL   Blood: x / Protein: 100 mg/dL / Nitrite: NEGATIVE   Leuk Esterase: NEGATIVE / RBC: < 5 /HPF / WBC > 10 /HPF   Sq Epi: x / Non Sq Epi: 0-5 /HPF / Bacteria: Present /HPF    MICROBIOLOGY:    Culture - Urine (collected 18 @ 15:14)  Source: .Urine Catheterized  Preliminary Report (18 @ 08:42):    Culture in progress    Culture - Blood (collected 18 @ 08:26)  Source: .Blood Blood  Preliminary Report (18 @ 09:02):    No growth at 1 day.    Culture - Blood (collected 18 @ 08:26)  Source: .Blood Blood  Preliminary Report (18 @ 09:02):    No growth at 1 day.      RADIOLOGY & ADDITIONAL STUDIES:  < from: Echocardiogram w/ Bubble and Doppler (18 @ 16:35) >  EXAM:  ECHO W BUBBLE AND DOPPLER COMP                          PROCEDURE DATE:  2018           INTERPRETATION:  Patient Height: 163.0 cm  Patient Weight: 91.0 kg  Heart Rate: 71 bpm  Systolic Pressure: 166 mmHg  Diastolic Pressure: 68 mmHg  BSA: 2.0 m^2  Interpretation Summary  There is mild concentric left ventricular hypertrophy.Probably normal left   ventricular wall motion.The left ventricle is hyperdynamic and the   overall   ejection fraction is increased (>75%).  The left atrial size is   normal.Injection of agitated saline contrast documented no interatrial   shunt.Right atrium not well visualized.The right ventricle is not well   visualized.Probably normal right ventricular size and function.No aortic   regurgitation noted.No hemodynamically significant valvular aortic   stenosis.There is mild mitral annular calcification.There is trace mitral   regurgitation.There was insufficient TR detected from which to calculate   pulmonary artery systolic pressure.  There is no pulmonic stenosis.No   aortic   root dilatation.The inferior vena cava is normal in size (<2.1 cm) with   normal   inspiratory collapse (>50%) consistent with normal right atrial pressure.  Procedure Details  A complete two-dimensional transthoracic echocardiogram was performed (2D,  M-mode, spectral and color flow doppler).  Study Quality: Poor.  The study was done portable in the SICU  Contrast injection with agitated saline was performed.  Left Ventricle  There is mild concentric left ventricular hypertrophy.  Probably normal left ventricular wall motion.  The left ventricle is hyperdynamic and the overall ejection fraction is  increased (>75%).  Left Atrium  The left atrial size is normal.  Injection of agitated saline contrast documented no interatrial shunt.  Right Atrium  Right atrium not well visualized.  Right Ventricle  The right ventricle is not well visualized.  Probably normal right ventricular size and function.  Aortic Valve  The number of aortic valve cusps cannot be discerned.  No aortic regurgitation noted.  No hemodynamically significant valvular aortic stenosis.  Mitral Valve  There is mild mitral annular calcification.  There is trace mitral regurgitation.  Tricuspid Valve  There was insufficient TR detected from which to calculate pulmonary   artery  systolic pressure.  Pulmonic Valve  There is no pulmonic stenosis.  Arteries and Venous System  No aortic root dilatation.  The inferior vena cava is normal in size (<2.1 cm) with normal inspiratory  collapse (>50%) consistent with normal right atrial pressure.  Doppler Measurements & Calculations  MV E point: 66.0 cm/sec  MV A point: 94.2 cm/sec  MV E/A: 0.7  Ao V2 max: 174.7 cm/sec  Ao max P.2 mmHg  Ao max PG (full): 2.8 mmHg  LICO(V,A): 2.4 cm^2  LICO(V,D): 2.4 cm^2  LV max P.4 mmHg  LV V1 max: 153.6 cm/sec  TR Max yossi: 137.4 cm/sec  MMode 2D Measurements & Calculations  IVSd: 1.2 cm  LVIDd: 4.1 cm  LVIDs: 2.6 cm  LVPWd: 1.1 cm  IVS/LVPW: 1.1  FS: 36.6 %  EDV(Teich): 73.1 ml  ESV(Teich):24.2 ml  LV mass(C)d: 167.6 grams  LV mass(C)dI: 85.4 grams/m^2  SI(cubed): 25.7 ml/m^2  Ao root diam: 2.9 cm  Ao root area: 6.5 cm^2  LA dimension: 3.4 cm  LA/Ao: 1.2  LVOT diam: 1.9 cm  LVOT area: 2.7 cm^2  LVOT area (M): 2.7 cm^2  EDV(MOD-sp4): 29ml  EDV(MOD-sp2): 19.6 ml  Interpreting Physician:Amy Huff DO electronically signed on   2018 17:17:24    "Thank you for the opportunity to participate in the care of this   patient."      AMY HUFF M.D., ATTENDING CARDIOLOGIST  This document has been electronically signed. 2018  5:17PM    < end of copied text >

## 2018-03-01 NOTE — PROGRESS NOTE ADULT - ASSESSMENT
62yr old F with PMHx significant for Bipolar Disorder, Schizophrenia, depression, CAD, COPD, HTN, HLD, DMII, TIA (03/2017) transferred from [facility] for evaluation of R sided weakness.   GI consulted for hematemesis    # Hematemesis -  - coffee grounds noted after passage of OGT  - now having more bilious appearing fluid via OGT  - ddx - stress ulcers, gastritis, PUD, dieulafoy lesion, esophagitis  - PPI BID vs IVinfusion  - Hgb did drop but this could be due to IVF hydration for Rx of initial septic shock or re-equilibration from dehydration at initial presentation  - continue to trend Hgb per ICU protocol  - patient is critically ill at this time and it is unlikely endoscopic procedures will provide any additional diagnostic information as to her overall picture - and could potentially aggravate her neurologic status  - will continue to hold off on endoscopy at this time as there is no further active bleeding noted - melena, BRBPR, hematemesis, coffee grounds  - continue excellent MICU care      Discussed with attending Dr Wu  GI will sign off, please reconsult us as needed

## 2018-03-01 NOTE — PROGRESS NOTE ADULT - ASSESSMENT
62Y F with PMH Bipolar Disorder, Schizophrenia, depression, CAD, COPD, HTN, HLD, DMII, TIA (03/2017) transfer from [facility] for potential intervention for L ICA occlusion, found to be in septic shock, likely lung source, admitted to MICU for pressor support.    ID  #SEPTIC SHOCK: Patient reportedly febrile (Tm101.3), tachycardic, hypotensive requiring pressor support with levophed. Started on Vanc and Zosyn. Unclear source. Given>48h hospitalization consider coverage for hospital acquired bacteria. On admission T 105 (rectal), HR >130s, WBC 46.7, Lactate 4.7 s/p 1L Bolus, Ofirmev. Unclear etiology, consider aspiration PNA in setting of stroke and intubation.    - Patient still requiring pressor support, will hold off on stress steroids at this time in light of persistently elevated FS as well as concern for possible GI bleed  - c/w Vanc and Zosyn (renally dosed)  - f/u vanc trough at 6 pm   - c/w IVF   - Blood Cultures, CXR, RVP, Lactate- f/u  - Will send sputum cx today  -UA positive, f/u Ucx  - continue to monitor    ENDO  #DM  Patient with known history of DM, Hgb 6.1% on arrival  -FS persistently elevated, >200  -Will initiate insulin gtt today  -Continue to monitor FS QID    #AG Metabolic Acidosis: AG 26 Lactate 4.7 on admission likely 2/2 to septic shock, unclear infectious etiology  - lactate cleared with IVF resuscitation, will continue with IVF      NEURO  #STROKE: patient w/ acute onset R sided weakness at 2/27 16:30 c/b resp distress s/p intubation. CTH CTA with L ICA occlusion. CTH CTA deferred as patient unstable. Pupils fixed, unresponsive to pain, R Gaze on exam. CT Head revealing acute/subacute infarcts involving the left frontal, temporal and parietal lobes along the left FANNY and MCA territories. No ICH  - Echo with doppler  - Lipid Panel, A1c, TSH, ECG  - OT/PT when extubated/indicated  - VEEG today    GI  #Coffee Ground Emesis  Sump pump suctioning approx. 200-400 cc on arrival, c/f GI bleed.  -GI consulted, appreciate recs  -Hgb stable, continue to closely monitor H/H  -c/w Protonix 40 mg IVP BID  -transfuse as needed, goal >8    HEME  #Coagulopathy  Patient with elevated INR on admission, unclear etiology  -will administer 2 u FFP today as well as IV Vit K (1 mg test dose, followed by 9 mg therapeutic dose)  -Continue to trend coags    PULM  #Acute Respiratory Failure: s/p intubation for reported desaturation during rapid response for R sided weakness. Per discharge, ?LLL consolidation. Currently on AC/VC  - CXR - f/u results   - Vent care: HOB>30 degrees, chlorhexidine DVT PPX, frequent suctioning    RENAL  PATSY: BUN: Cr 56:2.73, unclear baseline, Calculated FeNa c/w pre-renal likely 2/2 hypovolemia and septic shock. sCr downtrending with IVF resuscitation.   - c/w IVF resuscitation and continue to trend BMP  - Renal US if persists    PSYCH:  #Schizophrenia: known h/o schizophrenia, with reported agitation at hospital requiring 1:1, now intubated.   - Will obtain collateral from the family regarding history of schizophrenia/home medications    FEN  F: Discontinue IVF today  E: Replete PRN, Goal: K>4, Mg>2  N: NPO, will place NGT today and start feeds with Glucerna  Protonix 40mg daily, HepSubQ, SCDs    Lines: R IJ TLC (placed 2/28)    FULL CODE   CONTACT: Spouse [phone] (per chart) 62Y F with PMH Bipolar Disorder, Schizophrenia, depression, CAD, COPD, HTN, HLD, DMII, TIA (03/2017) transfer from [facility] for potential intervention for L ICA occlusion, found to be in septic shock, likely lung source, admitted to MICU for pressor support.    ID  #SEPTIC SHOCK: Patient reportedly febrile (Tm101.3), tachycardic, hypotensive requiring pressor support with levophed at [facility] and initiated on Vanc/Zosyn. Unclear source however likely lung given CT chest findings c/w multilobar PNA. No clear micro-organism identified however continuing to treat with broad spectrum abxs. Per [facility], outside Bcx and Ucx with NGTD, WBC downtrending and hypothermic.  - Patient still requiring pressor support, will hold off on stress steroids at this time in light of persistently elevated FS as well as concern for possible GI bleed  - c/w Vanc and Zosyn (renally dosed)  - Vanc trough 20 on 2/28, repeat trough in AM subtherapeutic, will re-dose Vanc at a reduced dose 1.25 g q12h, will continue to monitor vanc trough  - Blood Cultures NGTD, continue to follow  - If able to obtain enough secretions, will send sputum cx  - UA positive, f/u Ucx  - continue to monitor    CARDIO  #Troponemia  Patient noted to have abnormal ECG revealing TWIs in V2-V6 as well as 1 and aVL (new, compared to prior EKG in July 2017) and an incomplete RBBB in V1 (unchanged). Troponins elevated (peaked). Abnormal ECG as well as troponins likely 2/2 demand ischemia in the setting of septic shock and CVA.    ENDO  #DM  Patient with known history of DM, Hgb 6.1% on arrival  -FS on admission persistently elevated >200, now improved, discontinue insulin gtt today and initiate MISS  -NPO for now while sump pump in place, when NGT placed in the future, will initiate feeds with Glucerna  -Continue to monitor FS     #AG Metabolic Acidosis: AG 26 Lactate 4.7 on admission likely 2/2 to septic shock, unclear infectious etiology  - lactate cleared with IVF resuscitation, will discontinue IVF    NEURO  #STROKE: patient w/ acute onset R sided weakness at 2/27 16:30 c/b resp distress s/p intubation. CTH CTA with L ICA occlusion. CTH CTA deferred as patient unstable. Pupils fixed, unresponsive to pain, R Gaze on exam. CT Head revealing acute/subacute infarcts involving the left frontal, temporal and parietal lobes along the left FANNY and MCA territories. No ICH  - Echo revealing hyperdynamic LV, mild concentric LCH, EF >75%, no interatrial shunt, no HD significant valvulopathies.   - OT/PT when extubated/indicated  - VEEG revealing artifact, no seizures appreciated    GI  #Coffee Ground Emesis  Sump pump suctioning approx. 200-400 cc on arrival, c/f GI bleed.  -GI on board, recommending no endoscopic intervention at this time as patient too unstable and no longer draining coffee ground emesis, now bilious  -Hgb stable, continue to closely monitor H/H  -c/w Protonix 40 mg IVP BID  -transfuse as needed, goal >8    HEME  #Coagulopathy  Patient with elevated INR on admission, unclear etiology, s/p 2 u FFP and 10 mg IV Vitamin K on 2/28  -Continue to trend coags    PULM  #Acute Respiratory Failure: s/p intubation for reported desaturation during rapid response for R sided weakness. Per discharge, ?LLL consolidation. Currently on AC/VC  - CXR - f/u results   - Vent care: HOB>30 degrees, chlorhexidine DVT PPX, frequent suctioning    RENAL  PATSY: BUN: Cr 56:2.73, unclear baseline, Calculated FeNa c/w pre-renal likely 2/2 hypovolemia and septic shock. sCr downtrending with IVF resuscitation.   - c/w IVF resuscitation and continue to trend BMP  - Renal US if persists    PSYCH:  #Schizophrenia: known h/o schizophrenia, with reported agitation at hospital requiring 1:1, now intubated.   - Will obtain collateral from the family regarding history of schizophrenia/home medications    FEN  F: Discontinue IVF today  E: Replete PRN, Goal: K>4, Mg>2  N: NPO, sump pump in place draining bilious fluid  Protonix 40mg daily, HepSubQ, SCDs    Lines: R IJ TLC (placed 2/28)    FULL CODE   CONTACT: Spouse [phone] (per chart) 62Y F with PMH Bipolar Disorder, Schizophrenia, depression, CAD, COPD, HTN, HLD, DMII, TIA (03/2017) transfer from [facility] for potential intervention for L ICA occlusion, found to be in septic shock, likely lung source, admitted to MICU for pressor support.    ID  #SEPTIC SHOCK: Patient reportedly febrile (Tm101.3), tachycardic, hypotensive requiring pressor support with levophed at [facility] and initiated on Vanc/Zosyn. Unclear source however likely lung given CT chest findings c/w multilobar PNA. No clear micro-organism identified however continuing to treat with broad spectrum abxs. Per [facility], outside Bcx and Ucx with NGTD, WBC downtrending and hypothermic.  - Patient still requiring pressor support, will hold off on stress steroids at this time in light of persistently elevated FS as well as concern for possible GI bleed  - c/w Vanc and Zosyn (renally dosed)  - Vanc trough 20 on 2/28, repeat trough in AM subtherapeutic, will re-dose Vanc at a reduced dose 1.25 g q24h, will continue to monitor vanc trough  - Blood Cultures NGTD, continue to follow  - If able to obtain enough secretions, will send sputum cx  - UA positive, f/u Ucx  - continue to monitor    CARDIO  #Troponemia  Patient noted to have abnormal ECG revealing TWIs in V2-V6 as well as 1 and aVL (new, compared to prior EKG in July 2017) and an incomplete RBBB in V1 (unchanged). Troponins elevated (peaked). Abnormal ECG as well as troponins likely 2/2 demand ischemia in the setting of septic shock and CVA.    ENDO  #DM  Patient with known history of DM, Hgb 6.1% on arrival  -FS on admission persistently elevated >200, now improved, discontinue insulin gtt today and initiate MISS  -NPO for now while sump pump in place, when NGT placed in the future, will initiate feeds with Glucerna  -Continue to monitor FS     #AG Metabolic Acidosis: AG 26 Lactate 4.7 on admission likely 2/2 to septic shock, unclear infectious etiology  - lactate cleared with IVF resuscitation, will discontinue IVF    NEURO  #STROKE: patient w/ acute onset R sided weakness at 2/27 16:30 c/b resp distress s/p intubation. CTH CTA with L ICA occlusion. CTH CTA deferred as patient unstable. Pupils fixed, unresponsive to pain, R Gaze on exam. CT Head revealing acute/subacute infarcts involving the left frontal, temporal and parietal lobes along the left FANNY and MCA territories. No ICH  - Echo revealing hyperdynamic LV, mild concentric LCH, EF >75%, no interatrial shunt, no HD significant valvulopathies.   - OT/PT when extubated/indicated  - VEEG revealing artifact, no seizures appreciated    GI  #Coffee Ground Emesis  Sump pump suctioning approx. 200-400 cc on arrival, c/f GI bleed.  -GI on board, recommending no endoscopic intervention at this time as patient too unstable and no longer draining coffee ground emesis, now bilious  -Hgb stable, continue to closely monitor H/H  -c/w Protonix 40 mg IVP BID  -transfuse as needed, goal >8    HEME  #Coagulopathy  Patient with elevated INR on admission, unclear etiology, s/p 2 u FFP and 10 mg IV Vitamin K on 2/28  -Continue to trend coags    PULM  #Acute Respiratory Failure: s/p intubation for reported desaturation during rapid response for R sided weakness. Per discharge, ?LLL consolidation. Currently on AC/VC  - CXR - f/u results   - Vent care: HOB>30 degrees, chlorhexidine DVT PPX, frequent suctioning    RENAL  PATSY: BUN: Cr 56:2.73, unclear baseline, Calculated FeNa c/w pre-renal likely 2/2 hypovolemia and septic shock. sCr downtrending with IVF resuscitation.   - c/w IVF resuscitation and continue to trend BMP  - Renal US if persists    PSYCH:  #Schizophrenia: known h/o schizophrenia, with reported agitation at hospital requiring 1:1, now intubated.   - Will obtain collateral from the family regarding history of schizophrenia/home medications    FEN  F: Discontinue IVF today  E: Replete PRN, Goal: K>4, Mg>2  N: NPO, sump pump in place draining bilious fluid  Protonix 40mg daily, HepSubQ, SCDs    Lines: R IJ TLC (placed 2/28)    FULL CODE   CONTACT: Spouse [phone] (per chart)

## 2018-03-01 NOTE — PROGRESS NOTE ADULT - SUBJECTIVE AND OBJECTIVE BOX
INTERVAL HPI/OVERNIGHT EVENTS: Pt w/ upper ext twitching concern for stroke v hypothermia (was cooling), epilepsy called but was unable to see anything VEEG due to interference. hypokalemic (3.1), repleted. Serum osmalality high (329) but Na 148. Insulin drip was stoped for FS<100, but restarted shortly afterwards for rising BS and rising ICP     SUBJECTIVE: Patient seen and examined at bedside. Patient inubated, sedated, not following commands, unarousable to noxious stimuli.     OBJECTIVE:    VITAL SIGNS:  ICU Vital Signs Last 24 Hrs  T(C): 37.9 (01 Mar 2018 08:00), Max: 38.3 (2018 19:00)  T(F): 100.3 (01 Mar 2018 08:00), Max: 100.9 (2018 19:00)  HR: 98 (01 Mar 2018 08:00) (64 - 118)  BP: 148/80 (01 Mar 2018 03:00) (118/72 - 172/82)  BP(mean): 110 (01 Mar 2018 03:00) (100 - 127)  ABP: 160/68 (01 Mar 2018 08:00) (136/60 - 178/78)  ABP(mean): 98 (01 Mar 2018 08:00) (80 - 112)  RR: 31 (01 Mar 2018 08:) (19 - 38)  SpO2: 99% (01 Mar 2018 08:00) (94% - 100%)    Mode: AC/ CMV (Assist Control/ Continuous Mandatory Ventilation), RR (machine): 14, TV (machine): 490, FiO2: 40, PEEP: 5, ITime: 1, MAP: 16, PIP: 28     @ 07: @ 07:00  --------------------------------------------------------  IN: 3643.2 mL / OUT: 2290 mL / NET: 1353.2 mL     @ 07:01   @ 08:58  --------------------------------------------------------  IN: 125.5 mL / OUT: 60 mL / NET: 65.5 mL      CAPILLARY BLOOD GLUCOSE      POCT Blood Glucose.: 113 mg/dL (01 Mar 2018 07:41)      PHYSICAL EXAM:      Constitutional: intubated and sedated, unarousable to noxious stimuli  	HEENT: NC/AT, pupils 2mm bilaterally non reactive with R gaze deviation, (+) corneal reflexes b/l  	Neck: supple; no JVD, R IJ TLC (c/d/i, placed )  	Respiratory: VC/AC, +rhonchi with scattered expiratory wheezing  	Cardiac: +S1/S2; RRR, no m/r/g appreciated on auscultation  	Gastrointestinal: protuberant, obese abdomen, soft, NT/ND    : montes in place  	Extremities: cool extremities, no clubbing or cyanosis; no peripheral edema, large hematoma along R trunk/hip/RLE (lateral aspect of thigh)  	Vascular: 1+ radial, femoral, DP    Neurologic: intubated and sedated, non responsive to verbal or noxious stimuli, 2mm pupils bilaterally, minimally reactive; + dolls eyes      MEDICATIONS:  MEDICATIONS  (STANDING):  chlorhexidine 0.12% Liquid 15 milliLiter(s) Swish and Spit two times a day  dextrose 5%. 1000 milliLiter(s) (50 mL/Hr) IV Continuous <Continuous>  dextrose 50% Injectable 12.5 Gram(s) IV Push once  dextrose 50% Injectable 25 Gram(s) IV Push once  dextrose 50% Injectable 25 Gram(s) IV Push once  insulin Infusion 3 Unit(s)/Hr (3 mL/Hr) IV Continuous <Continuous>  norepinephrine Infusion 0.01 MICROgram(s)/kG/Min (0.836 mL/Hr) IV Continuous <Continuous>  pantoprazole  Injectable 40 milliGRAM(s) IV Push every 12 hours  piperacillin/tazobactam IVPB. 3.375 Gram(s) IV Intermittent every 6 hours  sodium chloride 0.9%. 1000 milliLiter(s) (100 mL/Hr) IV Continuous <Continuous>  vasopressin Infusion 0.04 Unit(s)/Min (2.4 mL/Hr) IV Continuous <Continuous>    MEDICATIONS  (PRN):  dextrose Gel 1 Dose(s) Oral once PRN Blood Glucose LESS THAN 70 milliGRAM(s)/deciliter  glucagon  Injectable 1 milliGRAM(s) IntraMuscular once PRN Glucose LESS THAN 70 milligrams/deciliter      ALLERGIES:  Allergies    Cipro (Other)  Geodon (Other)  Haldol (Other)  SULFA (Swelling)  ziprasidone (Unknown)    Intolerances        LABS:                        10.0   31.6  )-----------( 171      ( 01 Mar 2018 06:45 )             31.0     03-01    151<H>  |  116<H>  |  53<H>  ----------------------------<  171<H>  3.4<L>   |  21<L>  |  1.89<H>    Ca    8.0<L>      01 Mar 2018 06:45  Phos  3.3     03-  Mg     2.3     03-    TPro  6.2  /  Alb  3.2<L>  /  TBili  0.7  /  DBili  x   /  AST  643<H>  /  ALT  738<H>  /  AlkPhos  68  03-01    PT/INR - ( 2018 22:07 )   PT: 14.4 sec;   INR: 1.29          PTT - ( 2018 22:07 )  PTT:25.7 sec  Urinalysis Basic - ( 2018 13:49 )    Color: Yellow / Appearance: Clear / S.020 / pH: x  Gluc: x / Ketone: NEGATIVE  / Bili: Negative / Urobili: 1.0 E.U./dL   Blood: x / Protein: 100 mg/dL / Nitrite: NEGATIVE   Leuk Esterase: NEGATIVE / RBC: < 5 /HPF / WBC > 10 /HPF   Sq Epi: x / Non Sq Epi: 0-5 /HPF / Bacteria: Present /HPF        RADIOLOGY & ADDITIONAL TESTS: Reviewed. INTERVAL HPI/OVERNIGHT EVENTS: Pt w/ upper ext twitching concern for stroke v hypothermia (was cooling), epilepsy called but was unable to see anything VEEG due to interference. hypokalemic (3.1), repleted. Serum osmalality high (329) but Na 148. Insulin drip was stoped for FS<100, but restarted shortly afterwards for rising BS and rising ICP     SUBJECTIVE: Patient seen and examined at bedside. Patient inubated, sedated, not following commands, unarousable to noxious stimuli.     OBJECTIVE:    VITAL SIGNS:  ICU Vital Signs Last 24 Hrs  T(C): 37.9 (01 Mar 2018 08:00), Max: 38.3 (2018 19:00)  T(F): 100.3 (01 Mar 2018 08:00), Max: 100.9 (2018 19:00)  HR: 98 (01 Mar 2018 08:00) (64 - 118)  BP: 148/80 (01 Mar 2018 03:00) (118/72 - 172/82)  BP(mean): 110 (01 Mar 2018 03:00) (100 - 127)  ABP: 160/68 (01 Mar 2018 08:00) (136/60 - 178/78)  ABP(mean): 98 (01 Mar 2018 08:00) (80 - 112)  RR: 31 (01 Mar 2018 08:) (19 - 38)  SpO2: 99% (01 Mar 2018 08:00) (94% - 100%)    Mode: AC/ CMV (Assist Control/ Continuous Mandatory Ventilation), RR (machine): 14, TV (machine): 490, FiO2: 40, PEEP: 5, ITime: 1, MAP: 16, PIP: 28     @ 07: @ 07:00  --------------------------------------------------------  IN: 3643.2 mL / OUT: 2290 mL / NET: 1353.2 mL     @ 07:01   @ 08:58  --------------------------------------------------------  IN: 125.5 mL / OUT: 60 mL / NET: 65.5 mL      CAPILLARY BLOOD GLUCOSE      POCT Blood Glucose.: 113 mg/dL (01 Mar 2018 07:41)      PHYSICAL EXAM:      Constitutional: intubated and sedated, unarousable to noxious stimuli  	HEENT: NC/AT, Pupils reactive to light however L pupil smaller than R  	Neck: supple; no JVD, R IJ TLC (c/d/i, placed )  	Respiratory: VC/AC, +rhonchi with scattered expiratory wheezing  	Cardiac: +S1/S2; RRR, no m/r/g appreciated on auscultation  	Gastrointestinal: protuberant, obese abdomen, soft, NT/ND    : montes in place  	Extremities: cool extremities, no clubbing or cyanosis; no peripheral edema, large hematoma along R trunk/hip/RLE (lateral aspect of thigh)  	Vascular: 1+ radial, femoral, DP    Neurologic: intubated and sedated, non responsive to verbal or noxious stimuli, 2mm pupils bilaterally, minimally reactive; + dolls eyes      MEDICATIONS:  MEDICATIONS  (STANDING):  chlorhexidine 0.12% Liquid 15 milliLiter(s) Swish and Spit two times a day  dextrose 5%. 1000 milliLiter(s) (50 mL/Hr) IV Continuous <Continuous>  dextrose 50% Injectable 12.5 Gram(s) IV Push once  dextrose 50% Injectable 25 Gram(s) IV Push once  dextrose 50% Injectable 25 Gram(s) IV Push once  insulin Infusion 3 Unit(s)/Hr (3 mL/Hr) IV Continuous <Continuous>  norepinephrine Infusion 0.01 MICROgram(s)/kG/Min (0.836 mL/Hr) IV Continuous <Continuous>  pantoprazole  Injectable 40 milliGRAM(s) IV Push every 12 hours  piperacillin/tazobactam IVPB. 3.375 Gram(s) IV Intermittent every 6 hours  sodium chloride 0.9%. 1000 milliLiter(s) (100 mL/Hr) IV Continuous <Continuous>  vasopressin Infusion 0.04 Unit(s)/Min (2.4 mL/Hr) IV Continuous <Continuous>    MEDICATIONS  (PRN):  dextrose Gel 1 Dose(s) Oral once PRN Blood Glucose LESS THAN 70 milliGRAM(s)/deciliter  glucagon  Injectable 1 milliGRAM(s) IntraMuscular once PRN Glucose LESS THAN 70 milligrams/deciliter      ALLERGIES:  Allergies    Cipro (Other)  Geodon (Other)  Haldol (Other)  SULFA (Swelling)  ziprasidone (Unknown)    Intolerances        LABS:                        10.0   31.6  )-----------( 171      ( 01 Mar 2018 06:45 )             31.0     03-01    151<H>  |  116<H>  |  53<H>  ----------------------------<  171<H>  3.4<L>   |  21<L>  |  1.89<H>    Ca    8.0<L>      01 Mar 2018 06:45  Phos  3.3     03-  Mg     2.3     03-    TPro  6.2  /  Alb  3.2<L>  /  TBili  0.7  /  DBili  x   /  AST  643<H>  /  ALT  738<H>  /  AlkPhos  68  03-01    PT/INR - ( 2018 22:07 )   PT: 14.4 sec;   INR: 1.29          PTT - ( 2018 22:07 )  PTT:25.7 sec  Urinalysis Basic - ( 2018 13:49 )    Color: Yellow / Appearance: Clear / S.020 / pH: x  Gluc: x / Ketone: NEGATIVE  / Bili: Negative / Urobili: 1.0 E.U./dL   Blood: x / Protein: 100 mg/dL / Nitrite: NEGATIVE   Leuk Esterase: NEGATIVE / RBC: < 5 /HPF / WBC > 10 /HPF   Sq Epi: x / Non Sq Epi: 0-5 /HPF / Bacteria: Present /HPF        RADIOLOGY & ADDITIONAL TESTS: Reviewed. INTERVAL HPI/OVERNIGHT EVENTS: Pt w/ upper ext twitching concern for stroke v hypothermia (was cooling), epilepsy called but was unable to see anything VEEG due to interference. hypokalemic (3.1), repleted. Serum osmalality high (329) but Na 148. Insulin drip was stoped for FS<100, but restarted shortly afterwards for rising BS and rising ICP     SUBJECTIVE: Patient seen and examined at bedside. Patient inubated, sedated, not following commands, unarousable to noxious stimuli.     OBJECTIVE:    VITAL SIGNS:  ICU Vital Signs Last 24 Hrs  T(C): 37.9 (01 Mar 2018 08:00), Max: 38.3 (2018 19:00)  T(F): 100.3 (01 Mar 2018 08:00), Max: 100.9 (2018 19:00)  HR: 98 (01 Mar 2018 08:00) (64 - 118)  BP: 148/80 (01 Mar 2018 03:00) (118/72 - 172/82)  BP(mean): 110 (01 Mar 2018 03:00) (100 - 127)  ABP: 160/68 (01 Mar 2018 08:00) (136/60 - 178/78)  ABP(mean): 98 (01 Mar 2018 08:00) (80 - 112)  RR: 31 (01 Mar 2018 08:) (19 - 38)  SpO2: 99% (01 Mar 2018 08:00) (94% - 100%)    Mode: AC/ CMV (Assist Control/ Continuous Mandatory Ventilation), RR (machine): 14, TV (machine): 490, FiO2: 40, PEEP: 5, ITime: 1, MAP: 16, PIP: 28     @ 07: @ 07:00  --------------------------------------------------------  IN: 3643.2 mL / OUT: 2290 mL / NET: 1353.2 mL     @ 07:01   @ 08:58  --------------------------------------------------------  IN: 125.5 mL / OUT: 60 mL / NET: 65.5 mL      CAPILLARY BLOOD GLUCOSE      POCT Blood Glucose.: 113 mg/dL (01 Mar 2018 07:41)      PHYSICAL EXAM:      Constitutional: intubated and sedated, unarousable to noxious stimuli  	HEENT: NC/AT, Pupils reactive to light however L pupil smaller than R  	Neck: supple; no JVD, R IJ TLC (c/d/i, placed )  	Respiratory: VC/AC, +rhonchi with scattered expiratory wheezing  	Cardiac: +S1/S2; RRR, no m/r/g appreciated on auscultation  	Gastrointestinal: protuberant, obese abdomen, soft, NT/ND    : montes in place  	Extremities: cool extremities, no clubbing or cyanosis; no peripheral edema, large hematoma along R trunk/hip/RLE (lateral aspect of thigh) (unchanged)  	Vascular: 1+ radial, femoral, DP    Neurologic: intubated and sedated, non responsive to verbal or noxious stimuli, 2mm pupils bilaterally, minimally reactive; + dolls eyes      MEDICATIONS:  MEDICATIONS  (STANDING):  chlorhexidine 0.12% Liquid 15 milliLiter(s) Swish and Spit two times a day  dextrose 5%. 1000 milliLiter(s) (50 mL/Hr) IV Continuous <Continuous>  dextrose 50% Injectable 12.5 Gram(s) IV Push once  dextrose 50% Injectable 25 Gram(s) IV Push once  dextrose 50% Injectable 25 Gram(s) IV Push once  insulin Infusion 3 Unit(s)/Hr (3 mL/Hr) IV Continuous <Continuous>  norepinephrine Infusion 0.01 MICROgram(s)/kG/Min (0.836 mL/Hr) IV Continuous <Continuous>  pantoprazole  Injectable 40 milliGRAM(s) IV Push every 12 hours  piperacillin/tazobactam IVPB. 3.375 Gram(s) IV Intermittent every 6 hours  sodium chloride 0.9%. 1000 milliLiter(s) (100 mL/Hr) IV Continuous <Continuous>  vasopressin Infusion 0.04 Unit(s)/Min (2.4 mL/Hr) IV Continuous <Continuous>    MEDICATIONS  (PRN):  dextrose Gel 1 Dose(s) Oral once PRN Blood Glucose LESS THAN 70 milliGRAM(s)/deciliter  glucagon  Injectable 1 milliGRAM(s) IntraMuscular once PRN Glucose LESS THAN 70 milligrams/deciliter      ALLERGIES:  Allergies    Cipro (Other)  Geodon (Other)  Haldol (Other)  SULFA (Swelling)  ziprasidone (Unknown)    Intolerances        LABS:                        10.0   31.6  )-----------( 171      ( 01 Mar 2018 06:45 )             31.0     03-    151<H>  |  116<H>  |  53<H>  ----------------------------<  171<H>  3.4<L>   |  21<L>  |  1.89<H>    Ca    8.0<L>      01 Mar 2018 06:45  Phos  3.3     03-  Mg     2.3     -    TPro  6.2  /  Alb  3.2<L>  /  TBili  0.7  /  DBili  x   /  AST  643<H>  /  ALT  738<H>  /  AlkPhos  68  03-    PT/INR - ( 2018 22:07 )   PT: 14.4 sec;   INR: 1.29          PTT - ( 2018 22:07 )  PTT:25.7 sec  Urinalysis Basic - ( 2018 13:49 )    Color: Yellow / Appearance: Clear / S.020 / pH: x  Gluc: x / Ketone: NEGATIVE  / Bili: Negative / Urobili: 1.0 E.U./dL   Blood: x / Protein: 100 mg/dL / Nitrite: NEGATIVE   Leuk Esterase: NEGATIVE / RBC: < 5 /HPF / WBC > 10 /HPF   Sq Epi: x / Non Sq Epi: 0-5 /HPF / Bacteria: Present /HPF        RADIOLOGY & ADDITIONAL TESTS: Reviewed.

## 2018-03-02 LAB
-  AMPICILLIN/SULBACTAM: SIGNIFICANT CHANGE UP
-  AMPICILLIN: SIGNIFICANT CHANGE UP
-  CEFAZOLIN: SIGNIFICANT CHANGE UP
-  CEFTRIAXONE: SIGNIFICANT CHANGE UP
-  CIPROFLOXACIN: SIGNIFICANT CHANGE UP
-  GENTAMICIN: SIGNIFICANT CHANGE UP
-  NITROFURANTOIN: SIGNIFICANT CHANGE UP
-  PIPERACILLIN/TAZOBACTAM: SIGNIFICANT CHANGE UP
-  TOBRAMYCIN: SIGNIFICANT CHANGE UP
-  TRIMETHOPRIM/SULFAMETHOXAZOLE: SIGNIFICANT CHANGE UP
ALBUMIN SERPL ELPH-MCNC: 3.3 G/DL — SIGNIFICANT CHANGE UP (ref 3.3–5)
ALP SERPL-CCNC: 70 U/L — SIGNIFICANT CHANGE UP (ref 40–120)
ALT FLD-CCNC: 1635 U/L — HIGH (ref 10–45)
ANION GAP SERPL CALC-SCNC: 14 MMOL/L — SIGNIFICANT CHANGE UP (ref 5–17)
ANISOCYTOSIS BLD QL: SLIGHT — SIGNIFICANT CHANGE UP
APPEARANCE UR: CLEAR — SIGNIFICANT CHANGE UP
AST SERPL-CCNC: 1135 U/L — HIGH (ref 10–40)
BASE EXCESS BLDA CALC-SCNC: -2.2 MMOL/L — LOW (ref -2–3)
BILIRUB SERPL-MCNC: 0.8 MG/DL — SIGNIFICANT CHANGE UP (ref 0.2–1.2)
BILIRUB UR-MCNC: NEGATIVE — SIGNIFICANT CHANGE UP
BUN SERPL-MCNC: 38 MG/DL — HIGH (ref 7–23)
CALCIUM SERPL-MCNC: 8.5 MG/DL — SIGNIFICANT CHANGE UP (ref 8.4–10.5)
CHLORIDE SERPL-SCNC: 120 MMOL/L — HIGH (ref 96–108)
CO2 SERPL-SCNC: 22 MMOL/L — SIGNIFICANT CHANGE UP (ref 22–31)
COLOR SPEC: YELLOW — SIGNIFICANT CHANGE UP
CREAT ?TM UR-MCNC: 73 MG/DL — SIGNIFICANT CHANGE UP
CREAT SERPL-MCNC: 1.48 MG/DL — HIGH (ref 0.5–1.3)
CULTURE RESULTS: SIGNIFICANT CHANGE UP
DIFF PNL FLD: (no result)
GAS PNL BLDA: SIGNIFICANT CHANGE UP
GAS PNL BLDV: SIGNIFICANT CHANGE UP
GIANT PLATELETS BLD QL SMEAR: PRESENT — SIGNIFICANT CHANGE UP
GLUCOSE BLDC GLUCOMTR-MCNC: 155 MG/DL — HIGH (ref 70–99)
GLUCOSE BLDC GLUCOMTR-MCNC: 189 MG/DL — HIGH (ref 70–99)
GLUCOSE BLDC GLUCOMTR-MCNC: 205 MG/DL — HIGH (ref 70–99)
GLUCOSE SERPL-MCNC: 186 MG/DL — HIGH (ref 70–99)
GLUCOSE UR QL: 500
HAV IGM SER-ACNC: SIGNIFICANT CHANGE UP
HBV CORE IGM SER-ACNC: SIGNIFICANT CHANGE UP
HBV SURFACE AG SER-ACNC: SIGNIFICANT CHANGE UP
HCO3 BLDA-SCNC: 20 MMOL/L — LOW (ref 21–28)
HCT VFR BLD CALC: 28.9 % — LOW (ref 34.5–45)
HCV AB S/CO SERPL IA: 0.13 S/CO — SIGNIFICANT CHANGE UP
HCV AB SERPL-IMP: SIGNIFICANT CHANGE UP
HGB BLD-MCNC: 9.3 G/DL — LOW (ref 11.5–15.5)
KETONES UR-MCNC: NEGATIVE — SIGNIFICANT CHANGE UP
LACTATE SERPL-SCNC: 1.3 MMOL/L — SIGNIFICANT CHANGE UP (ref 0.5–2)
LEUKOCYTE ESTERASE UR-ACNC: NEGATIVE — SIGNIFICANT CHANGE UP
LG PLATELETS BLD QL AUTO: PRESENT — SIGNIFICANT CHANGE UP
LYMPHOCYTES # BLD AUTO: 11 % — LOW (ref 13–44)
MAGNESIUM SERPL-MCNC: 2.5 MG/DL — SIGNIFICANT CHANGE UP (ref 1.6–2.6)
MANUAL SMEAR VERIFICATION: SIGNIFICANT CHANGE UP
MCHC RBC-ENTMCNC: 29.4 PG — SIGNIFICANT CHANGE UP (ref 27–34)
MCHC RBC-ENTMCNC: 32.2 G/DL — SIGNIFICANT CHANGE UP (ref 32–36)
MCV RBC AUTO: 91.5 FL — SIGNIFICANT CHANGE UP (ref 80–100)
METHOD TYPE: SIGNIFICANT CHANGE UP
MONOCYTES NFR BLD AUTO: 2 % — SIGNIFICANT CHANGE UP (ref 2–14)
NEUTROPHILS NFR BLD AUTO: 86 % — HIGH (ref 43–77)
NEUTS BAND # BLD: 1 % — SIGNIFICANT CHANGE UP
NITRITE UR-MCNC: NEGATIVE — SIGNIFICANT CHANGE UP
ORGANISM # SPEC MICROSCOPIC CNT: SIGNIFICANT CHANGE UP
ORGANISM # SPEC MICROSCOPIC CNT: SIGNIFICANT CHANGE UP
OSMOLALITY UR: 351 MOSMOL/KG — SIGNIFICANT CHANGE UP (ref 100–650)
PCO2 BLDA: 28 MMHG — LOW (ref 32–45)
PH BLDA: 7.49 — HIGH (ref 7.35–7.45)
PH UR: 5.5 — SIGNIFICANT CHANGE UP (ref 5–8)
PHOSPHATE SERPL-MCNC: 2.7 MG/DL — SIGNIFICANT CHANGE UP (ref 2.5–4.5)
PLAT MORPH BLD: (no result)
PLATELET # BLD AUTO: 162 K/UL — SIGNIFICANT CHANGE UP (ref 150–400)
PO2 BLDA: 138 MMHG — HIGH (ref 83–108)
POLYCHROMASIA BLD QL SMEAR: SLIGHT — SIGNIFICANT CHANGE UP
POTASSIUM SERPL-MCNC: 3.5 MMOL/L — SIGNIFICANT CHANGE UP (ref 3.5–5.3)
POTASSIUM SERPL-SCNC: 3.5 MMOL/L — SIGNIFICANT CHANGE UP (ref 3.5–5.3)
PROT SERPL-MCNC: 6 G/DL — SIGNIFICANT CHANGE UP (ref 6–8.3)
PROT UR-MCNC: NEGATIVE MG/DL — SIGNIFICANT CHANGE UP
RBC # BLD: 3.16 M/UL — LOW (ref 3.8–5.2)
RBC # FLD: 16.6 % — SIGNIFICANT CHANGE UP (ref 10.3–16.9)
RBC BLD AUTO: (no result)
SAO2 % BLDA: 99 % — SIGNIFICANT CHANGE UP (ref 95–100)
SODIUM SERPL-SCNC: 156 MMOL/L — HIGH (ref 135–145)
SODIUM UR-SCNC: 20 MMOL/L — SIGNIFICANT CHANGE UP
SP GR SPEC: <=1.005 — SIGNIFICANT CHANGE UP (ref 1–1.03)
SPECIMEN SOURCE: SIGNIFICANT CHANGE UP
UROBILINOGEN FLD QL: 0.2 E.U./DL — SIGNIFICANT CHANGE UP
VANCOMYCIN TROUGH SERPL-MCNC: 6 UG/ML — LOW (ref 10–20)
WBC # BLD: 20.1 K/UL — HIGH (ref 3.8–10.5)
WBC # FLD AUTO: 20.1 K/UL — HIGH (ref 3.8–10.5)

## 2018-03-02 PROCEDURE — 70450 CT HEAD/BRAIN W/O DYE: CPT | Mod: 26

## 2018-03-02 PROCEDURE — 99291 CRITICAL CARE FIRST HOUR: CPT

## 2018-03-02 PROCEDURE — 99233 SBSQ HOSP IP/OBS HIGH 50: CPT

## 2018-03-02 RX ORDER — PIPERACILLIN AND TAZOBACTAM 4; .5 G/20ML; G/20ML
3.38 INJECTION, POWDER, LYOPHILIZED, FOR SOLUTION INTRAVENOUS EVERY 6 HOURS
Qty: 0 | Refills: 0 | Status: DISCONTINUED | OUTPATIENT
Start: 2018-03-02 | End: 2018-03-03

## 2018-03-02 RX ORDER — SODIUM CHLORIDE 9 MG/ML
500 INJECTION, SOLUTION INTRAVENOUS
Qty: 0 | Refills: 0 | Status: COMPLETED | OUTPATIENT
Start: 2018-03-02 | End: 2018-03-02

## 2018-03-02 RX ORDER — HEPARIN SODIUM 5000 [USP'U]/ML
5000 INJECTION INTRAVENOUS; SUBCUTANEOUS EVERY 8 HOURS
Qty: 0 | Refills: 0 | Status: DISCONTINUED | OUTPATIENT
Start: 2018-03-02 | End: 2018-03-03

## 2018-03-02 RX ORDER — PIPERACILLIN AND TAZOBACTAM 4; .5 G/20ML; G/20ML
3.38 INJECTION, POWDER, LYOPHILIZED, FOR SOLUTION INTRAVENOUS EVERY 6 HOURS
Qty: 0 | Refills: 0 | Status: DISCONTINUED | OUTPATIENT
Start: 2018-03-02 | End: 2018-03-02

## 2018-03-02 RX ORDER — NOREPINEPHRINE BITARTRATE/D5W 8 MG/250ML
0.01 PLASTIC BAG, INJECTION (ML) INTRAVENOUS
Qty: 8 | Refills: 0 | Status: DISCONTINUED | OUTPATIENT
Start: 2018-03-02 | End: 2018-03-03

## 2018-03-02 RX ORDER — SODIUM CHLORIDE 9 MG/ML
1000 INJECTION INTRAMUSCULAR; INTRAVENOUS; SUBCUTANEOUS
Qty: 0 | Refills: 0 | Status: DISCONTINUED | OUTPATIENT
Start: 2018-03-02 | End: 2018-03-02

## 2018-03-02 RX ADMIN — Medication 3 MILLILITER(S): at 01:43

## 2018-03-02 RX ADMIN — PANTOPRAZOLE SODIUM 40 MILLIGRAM(S): 20 TABLET, DELAYED RELEASE ORAL at 18:09

## 2018-03-02 RX ADMIN — Medication 1.67 MICROGRAM(S)/KG/MIN: at 21:17

## 2018-03-02 RX ADMIN — SODIUM CHLORIDE 1000 MILLILITER(S): 9 INJECTION, SOLUTION INTRAVENOUS at 11:48

## 2018-03-02 RX ADMIN — PIPERACILLIN AND TAZOBACTAM 200 GRAM(S): 4; .5 INJECTION, POWDER, LYOPHILIZED, FOR SOLUTION INTRAVENOUS at 00:12

## 2018-03-02 RX ADMIN — Medication 3 MILLILITER(S): at 15:45

## 2018-03-02 RX ADMIN — Medication 3 MILLILITER(S): at 22:20

## 2018-03-02 RX ADMIN — Medication 1.67 MICROGRAM(S)/KG/MIN: at 13:39

## 2018-03-02 RX ADMIN — PIPERACILLIN AND TAZOBACTAM 200 GRAM(S): 4; .5 INJECTION, POWDER, LYOPHILIZED, FOR SOLUTION INTRAVENOUS at 20:58

## 2018-03-02 RX ADMIN — CHLORHEXIDINE GLUCONATE 15 MILLILITER(S): 213 SOLUTION TOPICAL at 07:10

## 2018-03-02 RX ADMIN — Medication 2: at 07:09

## 2018-03-02 RX ADMIN — PANTOPRAZOLE SODIUM 40 MILLIGRAM(S): 20 TABLET, DELAYED RELEASE ORAL at 07:09

## 2018-03-02 RX ADMIN — PIPERACILLIN AND TAZOBACTAM 200 GRAM(S): 4; .5 INJECTION, POWDER, LYOPHILIZED, FOR SOLUTION INTRAVENOUS at 14:02

## 2018-03-02 RX ADMIN — PIPERACILLIN AND TAZOBACTAM 200 GRAM(S): 4; .5 INJECTION, POWDER, LYOPHILIZED, FOR SOLUTION INTRAVENOUS at 07:09

## 2018-03-02 RX ADMIN — CHLORHEXIDINE GLUCONATE 15 MILLILITER(S): 213 SOLUTION TOPICAL at 18:09

## 2018-03-02 RX ADMIN — Medication 3 MILLILITER(S): at 18:35

## 2018-03-02 RX ADMIN — HEPARIN SODIUM 5000 UNIT(S): 5000 INJECTION INTRAVENOUS; SUBCUTANEOUS at 13:51

## 2018-03-02 RX ADMIN — Medication 4: at 13:00

## 2018-03-02 RX ADMIN — Medication 3 MILLILITER(S): at 09:12

## 2018-03-02 RX ADMIN — Medication 3 MILLILITER(S): at 05:06

## 2018-03-02 RX ADMIN — HEPARIN SODIUM 5000 UNIT(S): 5000 INJECTION INTRAVENOUS; SUBCUTANEOUS at 22:35

## 2018-03-02 NOTE — PROGRESS NOTE ADULT - SUBJECTIVE AND OBJECTIVE BOX
INTERVAL HPI/OVERNIGHT EVENTS: uptrending LFTs, Hep Panel negative . OGT output significant (>500cc) billious material. Abd exam unchanged. BP maintained above 130 BUT by increasing Levo requirements. Na at goal (151)    SUBJECTIVE: Patient seen and examined at bedside. Patient intubated, not sedated, only arousable to noxious stimuli of LE.    OBJECTIVE:    VITAL SIGNS:  ICU Vital Signs Last 24 Hrs  T(C): 36.9 (02 Mar 2018 01:27), Max: 38.1 (01 Mar 2018 18:00)  T(F): 98.5 (02 Mar 2018 01:27), Max: 100.6 (01 Mar 2018 18:00)  HR: 109 (02 Mar 2018 05:01) (92 - 116)  BP: 165/79 (02 Mar 2018 05:00) (123/68 - 165/79)  BP(mean): 111 (02 Mar 2018 05:00) (83 - 111)  ABP: 168/76 (02 Mar 2018 05:00) (120/60 - 168/76)  ABP(mean): 106 (02 Mar 2018 05:00) (82 - 106)  RR: 37 (02 Mar 2018 05:00) (20 - 50)  SpO2: 100% (02 Mar 2018 05:01) (98% - 100%)    Mode: AC/ CMV (Assist Control/ Continuous Mandatory Ventilation), RR (machine): 14, TV (machine): 490, FiO2: 40, PEEP: 5, ITime: 1, MAP: 16, T-Low: , PIP: 27     @ 07:01  -  -02 @ 07:00  --------------------------------------------------------  IN: 721 mL / OUT: 2435 mL / NET: -1714 mL      CAPILLARY BLOOD GLUCOSE      POCT Blood Glucose.: 155 mg/dL (02 Mar 2018 06:54)      PHYSICAL EXAM:      Constitutional: intubated and sedated, unarousable to noxious stimuli  	HEENT: NC/AT, Pupils reactive to light however L pupil smaller than R  	Neck: supple; no JVD, R IJ TLC (c/d/i, placed )  	Respiratory: VC/AC, +rhonchi with scattered expiratory wheezing  	Cardiac: +S1/S2; RRR, no m/r/g appreciated on auscultation  	Gastrointestinal: protuberant, obese abdomen, soft, NT/ND    : montes in place  	Extremities: cool extremities, no clubbing or cyanosis; no peripheral edema, large hematoma along R trunk/hip/RLE (lateral aspect of thigh) (unchanged)  	Vascular: 1+ radial, femoral, DP    Neurologic: intubated and sedated, non responsive to verbal or noxious stimuli, 2mm pupils bilaterally, minimally reactive; + dolls eyes    MEDICATIONS:  MEDICATIONS  (STANDING):  ALBUTerol/ipratropium for Nebulization 3 milliLiter(s) Nebulizer every 4 hours  chlorhexidine 0.12% Liquid 15 milliLiter(s) Swish and Spit two times a day  dextrose 5%. 1000 milliLiter(s) (50 mL/Hr) IV Continuous <Continuous>  dextrose 50% Injectable 12.5 Gram(s) IV Push once  dextrose 50% Injectable 25 Gram(s) IV Push once  dextrose 50% Injectable 25 Gram(s) IV Push once  insulin lispro (HumaLOG) corrective regimen sliding scale   SubCutaneous every 6 hours  norepinephrine Infusion 0.01 MICROgram(s)/kG/Min (1.673 mL/Hr) IV Continuous <Continuous>  pantoprazole  Injectable 40 milliGRAM(s) IV Push every 12 hours  piperacillin/tazobactam IVPB. 3.375 Gram(s) IV Intermittent every 6 hours  vancomycin  IVPB      vancomycin  IVPB 1250 milliGRAM(s) IV Intermittent every 24 hours    MEDICATIONS  (PRN):  dextrose Gel 1 Dose(s) Oral once PRN Blood Glucose LESS THAN 70 milliGRAM(s)/deciliter  glucagon  Injectable 1 milliGRAM(s) IntraMuscular once PRN Glucose LESS THAN 70 milligrams/deciliter  meperidine     Injectable 25 milliGRAM(s) IV Push every 6 hours PRN Rigors      ALLERGIES:  Allergies    Cipro (Other)  Geodon (Other)  Haldol (Other)  SULFA (Swelling)  sulfa drugs (Swelling)  ziprasidone (Unknown)    Intolerances        LABS:                        9.3    20.1  )-----------( 162      ( 02 Mar 2018 06:24 )             28.9     03-01    151<H>  |  117<H>  |  42<H>  ----------------------------<  160<H>  4.1   |  21<L>  |  1.53<H>    Ca    8.0<L>      01 Mar 2018 21:56  Phos  3.3       Mg     2.3         TPro  6.1  /  Alb  3.0<L>  /  TBili  0.8  /  DBili  x   /  AST  1160<H>  /  ALT  1347<H>  /  AlkPhos  64      PT/INR - ( 01 Mar 2018 15:26 )   PT: 14.2 sec;   INR: 1.27          PTT - ( 01 Mar 2018 15:26 )  PTT:24.7 sec  Urinalysis Basic - ( 2018 13:49 )    Color: Yellow / Appearance: Clear / S.020 / pH: x  Gluc: x / Ketone: NEGATIVE  / Bili: Negative / Urobili: 1.0 E.U./dL   Blood: x / Protein: 100 mg/dL / Nitrite: NEGATIVE   Leuk Esterase: NEGATIVE / RBC: < 5 /HPF / WBC > 10 /HPF   Sq Epi: x / Non Sq Epi: 0-5 /HPF / Bacteria: Present /HPF        RADIOLOGY & ADDITIONAL TESTS: Reviewed. INTERVAL HPI/OVERNIGHT EVENTS: uptrending LFTs, Hep Panel negative . OGT output significant (>500cc) billious material. Abd exam unchanged. BP maintained above 130 BUT by increasing Levo requirements. Na at goal (151)    SUBJECTIVE: Patient seen and examined at bedside. Patient intubated, not sedated, only arousable to noxious stimuli of LE.    OBJECTIVE:    VITAL SIGNS:  ICU Vital Signs Last 24 Hrs  T(C): 36.9 (02 Mar 2018 01:27), Max: 38.1 (01 Mar 2018 18:00)  T(F): 98.5 (02 Mar 2018 01:27), Max: 100.6 (01 Mar 2018 18:00)  HR: 109 (02 Mar 2018 05:01) (92 - 116)  BP: 165/79 (02 Mar 2018 05:00) (123/68 - 165/79)  BP(mean): 111 (02 Mar 2018 05:00) (83 - 111)  ABP: 168/76 (02 Mar 2018 05:00) (120/60 - 168/76)  ABP(mean): 106 (02 Mar 2018 05:00) (82 - 106)  RR: 37 (02 Mar 2018 05:00) (20 - 50)  SpO2: 100% (02 Mar 2018 05:01) (98% - 100%)    Mode: AC/ CMV (Assist Control/ Continuous Mandatory Ventilation), RR (machine): 14, TV (machine): 490, FiO2: 40, PEEP: 5, ITime: 1, MAP: 16, T-Low: , PIP: 27     @ 07:01  -  03-02 @ 07:00  --------------------------------------------------------  IN: 721 mL / OUT: 2435 mL / NET: -1714 mL      CAPILLARY BLOOD GLUCOSE      POCT Blood Glucose.: 155 mg/dL (02 Mar 2018 06:54)      PHYSICAL EXAM:      Constitutional: intubated and sedated, unarousable to noxious stimuli  	HEENT: NC/AT, Pupils reactive to light however L pupil smaller than R, ETT and OG in place, with bilious output  	Neck: supple; no JVD, R IJ TLC (c/d/i, placed )  	Respiratory: VC/AC, faint scattered expiratory wheezing  	Cardiac: +S1/S2; RRR, no m/r/g appreciated on auscultation  	Gastrointestinal: protuberant, obese abdomen, soft, NT/ND    : montes in place  	Extremities: cool extremities, no clubbing or cyanosis; no peripheral edema, large hematoma along R trunk/hip/RLE (lateral aspect of thigh) (unchanged)  	Vascular: 1+ radial, femoral, DP    Neurologic: intubated and sedated, non responsive to verbal or noxious stimuli, 2mm pupils bilaterally, minimally reactive; + dolls eyes    MEDICATIONS:  MEDICATIONS  (STANDING):  ALBUTerol/ipratropium for Nebulization 3 milliLiter(s) Nebulizer every 4 hours  chlorhexidine 0.12% Liquid 15 milliLiter(s) Swish and Spit two times a day  dextrose 5%. 1000 milliLiter(s) (50 mL/Hr) IV Continuous <Continuous>  dextrose 50% Injectable 12.5 Gram(s) IV Push once  dextrose 50% Injectable 25 Gram(s) IV Push once  dextrose 50% Injectable 25 Gram(s) IV Push once  insulin lispro (HumaLOG) corrective regimen sliding scale   SubCutaneous every 6 hours  norepinephrine Infusion 0.01 MICROgram(s)/kG/Min (1.673 mL/Hr) IV Continuous <Continuous>  pantoprazole  Injectable 40 milliGRAM(s) IV Push every 12 hours  piperacillin/tazobactam IVPB. 3.375 Gram(s) IV Intermittent every 6 hours  vancomycin  IVPB      vancomycin  IVPB 1250 milliGRAM(s) IV Intermittent every 24 hours    MEDICATIONS  (PRN):  dextrose Gel 1 Dose(s) Oral once PRN Blood Glucose LESS THAN 70 milliGRAM(s)/deciliter  glucagon  Injectable 1 milliGRAM(s) IntraMuscular once PRN Glucose LESS THAN 70 milligrams/deciliter  meperidine     Injectable 25 milliGRAM(s) IV Push every 6 hours PRN Rigors      ALLERGIES:  Allergies    Cipro (Other)  Geodon (Other)  Haldol (Other)  SULFA (Swelling)  sulfa drugs (Swelling)  ziprasidone (Unknown)    Intolerances        LABS:                        9.3    20.1  )-----------( 162      ( 02 Mar 2018 06:24 )             28.9     03-01    151<H>  |  117<H>  |  42<H>  ----------------------------<  160<H>  4.1   |  21<L>  |  1.53<H>    Ca    8.0<L>      01 Mar 2018 21:56  Phos  3.3       Mg     2.3         TPro  6.1  /  Alb  3.0<L>  /  TBili  0.8  /  DBili  x   /  AST  1160<H>  /  ALT  1347<H>  /  AlkPhos  64      PT/INR - ( 01 Mar 2018 15:26 )   PT: 14.2 sec;   INR: 1.27          PTT - ( 01 Mar 2018 15:26 )  PTT:24.7 sec  Urinalysis Basic - ( 2018 13:49 )    Color: Yellow / Appearance: Clear / S.020 / pH: x  Gluc: x / Ketone: NEGATIVE  / Bili: Negative / Urobili: 1.0 E.U./dL   Blood: x / Protein: 100 mg/dL / Nitrite: NEGATIVE   Leuk Esterase: NEGATIVE / RBC: < 5 /HPF / WBC > 10 /HPF   Sq Epi: x / Non Sq Epi: 0-5 /HPF / Bacteria: Present /HPF        RADIOLOGY & ADDITIONAL TESTS: Reviewed.

## 2018-03-02 NOTE — PROGRESS NOTE ADULT - SUBJECTIVE AND OBJECTIVE BOX
11:15AM  Neurology Stroke Follow Up     Interval History:  Patient seen and examined.  No major changes overnight.    vEEG still mainly artifact  Repeat CT Head performed this morning - CT Head No Cont (18 @ 11:23) >  1. Acute, evolving FANNY, MCA, and PCA territory infarcts with no   hemorrhagic transformation but marked transuncal and subfalcine shift   from left to right up to 1.6cm, all of which represents a marked interval   change.  2. Interval development of marked enlargement of the right temporal horn   suggestive of entrapment.    Medications:  MEDICATIONS  (STANDING):  ALBUTerol/ipratropium for Nebulization 3 milliLiter(s) Nebulizer every 4 hours  chlorhexidine 0.12% Liquid 15 milliLiter(s) Swish and Spit two times a day  dextrose 5%. 1000 milliLiter(s) (50 mL/Hr) IV Continuous <Continuous>  dextrose 50% Injectable 12.5 Gram(s) IV Push once  dextrose 50% Injectable 25 Gram(s) IV Push once  dextrose 50% Injectable 25 Gram(s) IV Push once  heparin  Injectable 5000 Unit(s) SubCutaneous every 8 hours  norepinephrine Infusion 0.01 MICROgram(s)/kG/Min (1.673 mL/Hr) IV Continuous <Continuous>  pantoprazole  Injectable 40 milliGRAM(s) IV Push every 12 hours  piperacillin/tazobactam IVPB. 3.375 Gram(s) IV Intermittent every 6 hours    MEDICATIONS  (PRN):  dextrose Gel 1 Dose(s) Oral once PRN Blood Glucose LESS THAN 70 milliGRAM(s)/deciliter  glucagon  Injectable 1 milliGRAM(s) IntraMuscular once PRN Glucose LESS THAN 70 milligrams/deciliter  meperidine     Injectable 25 milliGRAM(s) IV Push every 6 hours PRN Rigors      Allergies    Cipro (Other)  Geodon (Other)  Haldol (Other)  SULFA (Swelling)  ziprasidone (Unknown)    Exam:  Vital Signs Last 24 Hrs  T(C): 38.6 (02 Mar 2018 15:00), Max: 38.6 (02 Mar 2018 15:00)  T(F): 101.5 (02 Mar 2018 15:00), Max: 101.5 (02 Mar 2018 15:00)  HR: 100 (02 Mar 2018 18:00) (78 - 109)  BP: 165/79 (02 Mar 2018 05:00) (123/68 - 165/79)  BP(mean): 111 (02 Mar 2018 05:00) (83 - 111)  RR: 25 (02 Mar 2018 18:00) (19 - 39)  SpO2: 98% (02 Mar 2018 18:00) (98% - 100%)    Gen: Lying in bed, NAD  Neuro:  Mental status: no sedation, eyes closed, no response to pressure to shoulder      Cranial nerves: Left gaze preference with movement to midline on oculocephalic reflex, Pupils equally round and reactive to light, corneal reflex slightly decreased on right but brisker than yesterday, brisk on left, unable to assess facial droop due to ETT, + gag reflex    Motor:  No spontaneous movement     Sensation: Decreased response to pain to right than left.   Coordination: unable to perform  Reflexes: extensor Babinski bilaterally  Gait: deferred    Labs:                        9.3    20.1  )-----------( 162      ( 02 Mar 2018 06:24 )             28.9   03-02    156<H>  |  120<H>  |  38<H>  ----------------------------<  186<H>  3.5   |  22  |  1.48<H>    Ca    8.5      02 Mar 2018 06:24  Phos  2.7     03-02  Mg     2.5     03-02    TPro  6.0  /  Alb  3.3  /  TBili  0.8  /  DBili  x   /  AST  1135<H>  /  ALT  1635<H>  /  AlkPhos  70  03-02    PT/INR - ( 01 Mar 2018 15:26 )   PT: 14.2 sec;   INR: 1.27     PTT - ( 01 Mar 2018 15:26 )  PTT:24.7 sec    Urinalysis Basic - ( 2018 13:49 )    Color: Yellow / Appearance: Clear / S.020 / pH: x  Gluc: x / Ketone: NEGATIVE  / Bili: Negative / Urobili: 1.0 E.U./dL   Blood: x / Protein: 100 mg/dL / Nitrite: NEGATIVE   Leuk Esterase: NEGATIVE / RBC: < 5 /HPF / WBC > 10 /HPF   Sq Epi: x / Non Sq Epi: 0-5 /HPF / Bacteria: Present /HPF      Hemoglobin A1C, Whole Blood: 6.3 % ( @ 06:45)  Hemoglobin A1C, Whole Blood: 6.4 % ( @ 22:07)  Cholesterol, Serum: 154 mg/dL ( @ 05:53)  HDL Cholesterol, Serum: 24 mg/dL ( @ 05:53)  Triglycerides, Serum: 130 mg/dL ( @ 05:53)      Radiology:  see above    Assessment: 62 year-old female with multiple medical problems transferred from [place] for presumed right hemiparesis but course is complicated by severe sepsis and respiratory failure.  Neurological exam stable today compared to yesterday without signs of active herniation given that pupils are equal but very poor prognosis given the extensive midline shift seen on CT Head performed today.    Plan:  1) Consider Palliative consult for goals of care given very poor prognosis.  2) Continue care as per MICU for toxic encephalopathy due to severe sepsis.  3) DVT prophylaxis - SCDs in place

## 2018-03-02 NOTE — PROGRESS NOTE ADULT - ASSESSMENT
62Y F with PMH Bipolar Disorder, Schizophrenia, depression, CAD, COPD, HTN, HLD, DMII, TIA (03/2017) transfer from [facility] for potential intervention for L ICA occlusion, found to be in septic shock, currently being treated for multi-focal PNA, and admitted to MICU for pressor support.    ID  #SEPTIC SHOCK: Patient reportedly febrile (Tm101.3), tachycardic, hypotensive requiring pressor support with levophed at [facility] and initiated on Vanc/Zosyn. Unclear source however likely lung given CT chest findings c/w multilobar PNA. No clear micro-organism identified however continuing to treat with broad spectrum abxs. Per [facility], outside Bcx and Ucx with NGTD, WBC downtrending and hypothermic.  - Patient still requiring pressor support, will hold off on stress steroids at this time in light of persistently elevated FS as well as concern for possible GI bleed  - c/w Vanc and Zosyn (renally dosed)  - Vanc trough 20 on 2/28, repeat trough in AM subtherapeutic, will re-dose Vanc at a reduced dose 1.25 g q24h, will continue to monitor vanc trough  - Blood Cultures NGTD, continue to follow  - If able to obtain enough secretions, will send sputum cx  - UA positive, f/u Ucx  - continue to monitor    CARDIO  #Troponemia  Patient noted to have abnormal ECG revealing TWIs in V2-V6 as well as 1 and aVL (new, compared to prior EKG in July 2017) and an incomplete RBBB in V1 (unchanged). Troponins elevated (peaked). Abnormal ECG as well as troponins likely 2/2 demand ischemia in the setting of septic shock and CVA.    ENDO  #DM  Patient with known history of DM, Hgb 6.1% on arrival  -FS on admission persistently elevated >200, now improved, discontinue insulin gtt today and initiate MISS  -NPO for now while sump pump in place, when NGT placed in the future, will initiate feeds with Glucerna  -Continue to monitor FS     #AG Metabolic Acidosis: AG 26 Lactate 4.7 on admission likely 2/2 to septic shock, unclear infectious etiology  - lactate cleared with IVF resuscitation, will discontinue IVF    NEURO  #STROKE: patient w/ acute onset R sided weakness at 2/27 16:30 c/b resp distress s/p intubation. CTH CTA with L ICA occlusion. CTH CTA deferred as patient unstable. Pupils fixed, unresponsive to pain, R Gaze on exam. CT Head revealing acute/subacute infarcts involving the left frontal, temporal and parietal lobes along the left FANNY and MCA territories. No ICH  - Echo revealing hyperdynamic LV, mild concentric LCH, EF >75%, no interatrial shunt, no HD significant valvulopathies.   - OT/PT when extubated/indicated  - VEEG revealing artifact, no seizures appreciated    GI  #Coffee Ground Emesis  Sump pump suctioning approx. 200-400 cc on arrival, c/f GI bleed.  -GI on board, recommending no endoscopic intervention at this time as patient too unstable and no longer draining coffee ground emesis, now bilious  -Hgb stable, continue to closely monitor H/H  -c/w Protonix 40 mg IVP BID  -transfuse as needed, goal >8    HEME  #Coagulopathy  Patient with elevated INR on admission, unclear etiology, s/p 2 u FFP and 10 mg IV Vitamin K on 2/28  -Continue to trend coags    PULM  #Acute Respiratory Failure: s/p intubation for reported desaturation during rapid response for R sided weakness. Per discharge, ?LLL consolidation. Currently on AC/VC  - CXR - f/u results   - Vent care: HOB>30 degrees, chlorhexidine DVT PPX, frequent suctioning    RENAL  PATSY: BUN: Cr 56:2.73, unclear baseline, Calculated FeNa c/w pre-renal likely 2/2 hypovolemia and septic shock. sCr downtrending with IVF resuscitation.   - c/w IVF resuscitation and continue to trend BMP  - Renal US if persists    PSYCH:  #Schizophrenia: known h/o schizophrenia, with reported agitation at hospital requiring 1:1, now intubated.   - Will obtain collateral from the family regarding history of schizophrenia/home medications    FEN  F: Discontinue IVF today  E: Replete PRN, Goal: K>4, Mg>2  N: NPO, sump pump in place draining bilious fluid  Protonix 40mg daily, HepSubQ, SCDs    Lines: R IJ TLC (placed 2/28)    FULL CODE   CONTACT: Spouse [phone] (per chart) 62Y F with PMH Bipolar Disorder, Schizophrenia, depression, CAD, COPD, HTN, HLD, DMII, TIA (03/2017) transfer from [facility] for potential intervention for L ICA occlusion, found to be in septic shock, currently being treated for multi-focal PNA, and admitted to MICU for pressor support.    ID  #SEPTIC SHOCK: Patient reportedly febrile (Tm101.3), tachycardic, hypotensive requiring pressor support with levophed at [facility] and initiated on Vanc/Zosyn. Unclear source however likely lung given CT chest findings c/w multilobar PNA. No clear micro-organism identified however continuing to treat with broad spectrum abxs. Per [facility], outside Bcx and Ucx with NGTD, WBC downtrending and hypothermic.  - Patient still requiring pressor support, will hold off on stress steroids at this time in light of persistently elevated FS as well as concern for possible GI bleed  - c/w Vanc and Zosyn (renally dosed)  - Vanc trough 20 on 2/28, repeat trough in AM subtherapeutic, will re-dose Vanc at a reduced dose 1.25 g q24h, will continue to monitor vanc trough  - Blood Cultures NGTD, continue to follow  - If able to obtain enough secretions, will send sputum cx  - UA positive, f/u Ucx  - continue to monitor    CARDIO  #Troponemia  Patient noted to have abnormal ECG revealing TWIs in V2-V6 as well as 1 and aVL (new, compared to prior EKG in July 2017) and an incomplete RBBB in V1 (unchanged). Troponins elevated (peaked). Abnormal ECG as well as troponins likely 2/2 demand ischemia in the setting of septic shock and CVA.    ENDO  #DM  Patient with known history of DM, Hgb 6.1% on arrival  -FS on admission persistently elevated >200, now improved, discontinue insulin gtt today and initiate MISS  -NPO for now while sump pump in place, when NGT placed in the future, will initiate feeds with Glucerna  -Continue to monitor FS     #AG Metabolic Acidosis: AG 26 Lactate 4.7 on admission likely 2/2 to septic shock, unclear infectious etiology  - lactate cleared with IVF resuscitation, will discontinue IVF    NEURO  #STROKE: patient w/ acute onset R sided weakness at 2/27 16:30 c/b resp distress s/p intubation. CTH CTA with L ICA occlusion. CTH CTA deferred as patient unstable. Pupils fixed, unresponsive to pain, R Gaze on exam. CT Head revealing acute/subacute infarcts involving the left frontal, temporal and parietal lobes along the left FANNY and MCA territories. No ICH  - Echo revealing hyperdynamic LV, mild concentric LCH, EF >75%, no interatrial shunt, no HD significant valvulopathies.   - OT/PT when extubated/indicated  - VEEG revealing artifact, no seizures appreciated    GI  #Coffee Ground Emesis  Sump pump suctioning approx. 200-400 cc on arrival, c/f GI bleed.  -GI on board, recommending no endoscopic intervention at this time as patient too unstable and no longer draining coffee ground emesis, now bilious  -Hgb stable, continue to closely monitor H/H  -c/w Protonix 40 mg IVP BID  -transfuse as needed, goal >8    HEME  #Coagulopathy  Patient with elevated INR on admission, unclear etiology, s/p 2 u FFP and 10 mg IV Vitamin K on 2/28  -Continue to trend coags    PULM  #Acute Respiratory Failure: s/p intubation for reported desaturation during rapid response for R sided weakness. Per discharge, ?LLL consolidation. Currently on AC/VC  - CXR - f/u results   - Vent care: HOB>30 degrees, chlorhexidine DVT PPX, frequent suctioning    RENAL  PATSY: BUN: Cr 56:2.73, unclear baseline, Calculated FeNa c/w pre-renal likely 2/2 hypovolemia and septic shock. sCr downtrending with IVF resuscitation.   - c/w IVF resuscitation and continue to trend BMP  - Renal US if persists    PSYCH:  #Schizophrenia: known h/o schizophrenia, with reported agitation at hospital requiring 1:1, now intubated.   - Will obtain collateral from the family regarding history of schizophrenia/home medications    PALLIATIVE  Discussed with patient's  regarding poor neurologic prognosis given most recent CT findings. Patient's  in agreement that escalating care would not be appropriate and that patient should be made DNR.     FEN  F: 1/4NS @ 125 cc/hr  E: Replete PRN, Goal: K>4, Mg>2  N: NPO, sump pump in place draining bilious fluid  Protonix 40mg daily, HepSubQ, SCDs    Lines: R IJ TLC (placed 2/28)    FULL CODE   CONTACT: Spouse [phone] (per chart) 62Y F with PMH Bipolar Disorder, Schizophrenia, depression, CAD, COPD, HTN, HLD, DMII, TIA (03/2017) transfer from [facility] for potential intervention for L ICA occlusion, found to be in septic shock, currently being treated for multi-focal PNA, and admitted to MICU for pressor support.    ID  #SEPTIC SHOCK: Patient reportedly febrile (Tm101.3), tachycardic, hypotensive requiring pressor support with levophed at [facility] and initiated on Vanc/Zosyn. Unclear source however likely lung given CT chest findings c/w multilobar PNA. No clear micro-organism identified however continuing to treat with broad spectrum abxs. Per [facility], outside Bcx and Ucx with NGTD, WBC downtrending.  - Patient still requiring pressor support, per discussion with  today regarding poor neurological prognosis, will not escalate care, pressors not to be increased  - c/w Zosyn (renally dosed), (Day 5, 3/2, patient received 2 days of Zosyn prior to arriving to [facility]), will complete 7 day course  - Vanc d/c-ed today  - Blood Cultures NGTD, continue to follow  - sputum cx with few WBCs, culture pending  - UA positive, Ucx grew out 2000 Proteus mirabilis  - continue to monitor    CARDIO  #Troponemia  Patient noted to have abnormal ECG revealing TWIs in V2-V6 as well as 1 and aVL (new, compared to prior EKG in July 2017) and an incomplete RBBB in V1 (unchanged). Troponins elevated (peaked). Abnormal ECG as well as troponins likely 2/2 demand ischemia in the setting of septic shock and CVA.    ENDO  #DM  Patient with known history of DM, Hgb 6.1% on arrival  -FS on admission persistently elevated >200, now improved, c/w ISS  -NPO for now while sump pump in place, still draining bilious fluid    #AG Metabolic Acidosis: AG 26 Lactate 4.7 on admission likely 2/2 to septic shock, unclear infectious etiology  - lactate cleared with IVF resuscitation    NEURO  #STROKE: patient w/ acute onset R sided weakness at 2/27 16:30 c/b resp distress s/p intubation. CTH CTA with L ICA occlusion. CTH CTA deferred as patient unstable. Pupils fixed, unresponsive to pain, R Gaze on exam. CT Head revealing acute/subacute infarcts involving the left frontal, temporal and parietal lobes along the left FANNY and MCA territories. No ICH  - Echo revealing hyperdynamic LV, mild concentric LCH, EF >75%, no interatrial shunt, no HD significant valvulopathies.   - VEEG revealing artifact, no seizures appreciated  - Repeat CT Head revealing acute, evolving FANNY, MCA, and PCA territory infarcts with no hemorrhagic transformation but marked trans-uncal and subfalcine shift from L-->R up to 1.6cm, all of which represents a marked interval change,  Interval development of marked enlargement of the right temporal horn suggestive of entrapment.  -GOC discussed with patient's  today as well as the patient's overall poor neurological prognosis, agreed that the patient would be made DNR and that no further escalation of care. Patient's  would discuss with the patient's siblings regarding withdrawal of care and would contact the medical team regarding their decision.    GI  #Coffee Ground Emesis  Sump pump suctioning approx. 200-400 cc on arrival, c/f GI bleed however now suctioning bilious material, GI recommending no endoscopic intervention.   -No additional blood work as no escalation of care as per conversation with patient's  today  -c/w Protonix 40 mg IVP BID    HEME  #Coagulopathy  Patient with elevated INR on admission, unclear etiology, s/p 2 u FFP and 10 mg IV Vitamin K on 2/28    PULM  #Acute Respiratory Failure: s/p intubation for reported desaturation during rapid response for R sided weakness. Per discharge, ?LLL consolidation. Currently on AC/VC  - Vent care: HOB>30 degrees, chlorhexidine DVT PPX, frequent suctioning    RENAL  PATSY: BUN: Cr 56:2.73, unclear baseline, Calculated FeNa c/w pre-renal likely 2/2 hypovolemia and septic shock. sCr downtrending with IVF resuscitation.     PSYCH:  #Schizophrenia: known h/o schizophrenia, with reported agitation at hospital requiring 1:1, now intubated.     PALLIATIVE  Discussed with patient's  regarding poor neurologic prognosis given most recent CT findings. Patient's  in agreement that escalating care would not be appropriate and that patient should be made DNR.     FEN  F: 1/4NS @ 125 cc/hr  E: Replete PRN, Goal: K>4, Mg>2  N: NPO, sump pump in place draining bilious fluid  Protonix 40mg daily, HepSubQ, SCDs    Lines: R IJ TLC (placed 2/28)    FULL CODE   CONTACT: Spouse [phone] (per chart)

## 2018-03-02 NOTE — CHART NOTE - NSCHARTNOTEFT_GEN_A_CORE
I spoke with Dr. Marshall who reviewed the CT scan of the Head. she agrees with grave prognosis and that level of care discussions appropriate to have with Mr. Reno. I called  and explained the implications of today's scan and that there is no chance for meaningful neurologic recovery. He agrees that escalation of care would be futile and DNR is appropriate. We will not add any new therapies that would prolong her suffering including increasing vasopressors. Mr. Reno will speak to his wifes siblings regarding withdrawal of care. Dr. Marshall informed of our discussion and in agreement.

## 2018-03-03 LAB
CULTURE RESULTS: NO GROWTH — SIGNIFICANT CHANGE UP
SPECIMEN SOURCE: SIGNIFICANT CHANGE UP

## 2018-03-03 PROCEDURE — 80202 ASSAY OF VANCOMYCIN: CPT

## 2018-03-03 PROCEDURE — 87070 CULTURE OTHR SPECIMN AEROBIC: CPT

## 2018-03-03 PROCEDURE — 95951: CPT

## 2018-03-03 PROCEDURE — 80053 COMPREHEN METABOLIC PANEL: CPT

## 2018-03-03 PROCEDURE — 93306 TTE W/DOPPLER COMPLETE: CPT

## 2018-03-03 PROCEDURE — 82803 BLOOD GASES ANY COMBINATION: CPT

## 2018-03-03 PROCEDURE — 82330 ASSAY OF CALCIUM: CPT

## 2018-03-03 PROCEDURE — 83036 HEMOGLOBIN GLYCOSYLATED A1C: CPT

## 2018-03-03 PROCEDURE — 84295 ASSAY OF SERUM SODIUM: CPT

## 2018-03-03 PROCEDURE — 84132 ASSAY OF SERUM POTASSIUM: CPT

## 2018-03-03 PROCEDURE — 85025 COMPLETE CBC W/AUTO DIFF WBC: CPT

## 2018-03-03 PROCEDURE — 36415 COLL VENOUS BLD VENIPUNCTURE: CPT

## 2018-03-03 PROCEDURE — 80074 ACUTE HEPATITIS PANEL: CPT

## 2018-03-03 PROCEDURE — 84480 ASSAY TRIIODOTHYRONINE (T3): CPT

## 2018-03-03 PROCEDURE — 86901 BLOOD TYPING SEROLOGIC RH(D): CPT

## 2018-03-03 PROCEDURE — 85610 PROTHROMBIN TIME: CPT

## 2018-03-03 PROCEDURE — 83935 ASSAY OF URINE OSMOLALITY: CPT

## 2018-03-03 PROCEDURE — 82962 GLUCOSE BLOOD TEST: CPT

## 2018-03-03 PROCEDURE — 82340 ASSAY OF CALCIUM IN URINE: CPT

## 2018-03-03 PROCEDURE — 86850 RBC ANTIBODY SCREEN: CPT

## 2018-03-03 PROCEDURE — 94002 VENT MGMT INPAT INIT DAY: CPT

## 2018-03-03 PROCEDURE — 86900 BLOOD TYPING SEROLOGIC ABO: CPT

## 2018-03-03 PROCEDURE — 84443 ASSAY THYROID STIM HORMONE: CPT

## 2018-03-03 PROCEDURE — 85027 COMPLETE CBC AUTOMATED: CPT

## 2018-03-03 PROCEDURE — 70450 CT HEAD/BRAIN W/O DYE: CPT

## 2018-03-03 PROCEDURE — 87086 URINE CULTURE/COLONY COUNT: CPT

## 2018-03-03 PROCEDURE — 83735 ASSAY OF MAGNESIUM: CPT

## 2018-03-03 PROCEDURE — 87040 BLOOD CULTURE FOR BACTERIA: CPT

## 2018-03-03 PROCEDURE — 83605 ASSAY OF LACTIC ACID: CPT

## 2018-03-03 PROCEDURE — 82436 ASSAY OF URINE CHLORIDE: CPT

## 2018-03-03 PROCEDURE — 83930 ASSAY OF BLOOD OSMOLALITY: CPT

## 2018-03-03 PROCEDURE — 36430 TRANSFUSION BLD/BLD COMPNT: CPT

## 2018-03-03 PROCEDURE — 84540 ASSAY OF URINE/UREA-N: CPT

## 2018-03-03 PROCEDURE — 76700 US EXAM ABDOM COMPLETE: CPT

## 2018-03-03 PROCEDURE — 84484 ASSAY OF TROPONIN QUANT: CPT

## 2018-03-03 PROCEDURE — 74018 RADEX ABDOMEN 1 VIEW: CPT

## 2018-03-03 PROCEDURE — 85730 THROMBOPLASTIN TIME PARTIAL: CPT

## 2018-03-03 PROCEDURE — 71250 CT THORAX DX C-: CPT

## 2018-03-03 PROCEDURE — 82570 ASSAY OF URINE CREATININE: CPT

## 2018-03-03 PROCEDURE — 82550 ASSAY OF CK (CPK): CPT

## 2018-03-03 PROCEDURE — 74176 CT ABD & PELVIS W/O CONTRAST: CPT

## 2018-03-03 PROCEDURE — 93005 ELECTROCARDIOGRAM TRACING: CPT

## 2018-03-03 PROCEDURE — 82553 CREATINE MB FRACTION: CPT

## 2018-03-03 PROCEDURE — 99232 SBSQ HOSP IP/OBS MODERATE 35: CPT | Mod: GC

## 2018-03-03 PROCEDURE — 82310 ASSAY OF CALCIUM: CPT

## 2018-03-03 PROCEDURE — 94003 VENT MGMT INPAT SUBQ DAY: CPT

## 2018-03-03 PROCEDURE — 84100 ASSAY OF PHOSPHORUS: CPT

## 2018-03-03 PROCEDURE — 81001 URINALYSIS AUTO W/SCOPE: CPT

## 2018-03-03 PROCEDURE — 84436 ASSAY OF TOTAL THYROXINE: CPT

## 2018-03-03 PROCEDURE — 84133 ASSAY OF URINE POTASSIUM: CPT

## 2018-03-03 PROCEDURE — P9017: CPT

## 2018-03-03 PROCEDURE — 71045 X-RAY EXAM CHEST 1 VIEW: CPT

## 2018-03-03 PROCEDURE — 80061 LIPID PANEL: CPT

## 2018-03-03 PROCEDURE — 87186 SC STD MICRODIL/AGAR DIL: CPT

## 2018-03-03 PROCEDURE — 94640 AIRWAY INHALATION TREATMENT: CPT

## 2018-03-03 PROCEDURE — 84300 ASSAY OF URINE SODIUM: CPT

## 2018-03-03 RX ORDER — ROBINUL 0.2 MG/ML
0.2 INJECTION INTRAMUSCULAR; INTRAVENOUS ONCE
Qty: 0 | Refills: 0 | Status: COMPLETED | OUTPATIENT
Start: 2018-03-03 | End: 2018-03-03

## 2018-03-03 RX ORDER — MORPHINE SULFATE 50 MG/1
2 CAPSULE, EXTENDED RELEASE ORAL
Qty: 100 | Refills: 0 | Status: DISCONTINUED | OUTPATIENT
Start: 2018-03-03 | End: 2018-03-03

## 2018-03-03 RX ORDER — ATROPINE SULFATE 1 %
2 DROPS OPHTHALMIC (EYE) EVERY 4 HOURS
Qty: 0 | Refills: 0 | Status: DISCONTINUED | OUTPATIENT
Start: 2018-03-03 | End: 2018-03-03

## 2018-03-03 RX ORDER — MORPHINE SULFATE 50 MG/1
1 CAPSULE, EXTENDED RELEASE ORAL
Qty: 100 | Refills: 0 | Status: DISCONTINUED | OUTPATIENT
Start: 2018-03-03 | End: 2018-03-03

## 2018-03-03 RX ORDER — SCOPALAMINE 1 MG/3D
1 PATCH, EXTENDED RELEASE TRANSDERMAL ONCE
Qty: 0 | Refills: 0 | Status: COMPLETED | OUTPATIENT
Start: 2018-03-03 | End: 2018-03-03

## 2018-03-03 RX ORDER — MORPHINE SULFATE 50 MG/1
4 CAPSULE, EXTENDED RELEASE ORAL ONCE
Qty: 0 | Refills: 0 | Status: DISCONTINUED | OUTPATIENT
Start: 2018-03-03 | End: 2018-03-03

## 2018-03-03 RX ORDER — ACETAMINOPHEN 500 MG
1000 TABLET ORAL ONCE
Qty: 0 | Refills: 0 | Status: COMPLETED | OUTPATIENT
Start: 2018-03-03 | End: 2018-03-03

## 2018-03-03 RX ADMIN — MORPHINE SULFATE 1 MG/HR: 50 CAPSULE, EXTENDED RELEASE ORAL at 14:05

## 2018-03-03 RX ADMIN — Medication 400 MILLIGRAM(S): at 08:07

## 2018-03-03 RX ADMIN — PANTOPRAZOLE SODIUM 40 MILLIGRAM(S): 20 TABLET, DELAYED RELEASE ORAL at 10:39

## 2018-03-03 RX ADMIN — PIPERACILLIN AND TAZOBACTAM 200 GRAM(S): 4; .5 INJECTION, POWDER, LYOPHILIZED, FOR SOLUTION INTRAVENOUS at 09:55

## 2018-03-03 RX ADMIN — Medication 3 MILLILITER(S): at 01:45

## 2018-03-03 RX ADMIN — Medication 3 MILLILITER(S): at 05:37

## 2018-03-03 RX ADMIN — CHLORHEXIDINE GLUCONATE 15 MILLILITER(S): 213 SOLUTION TOPICAL at 10:40

## 2018-03-03 RX ADMIN — PIPERACILLIN AND TAZOBACTAM 200 GRAM(S): 4; .5 INJECTION, POWDER, LYOPHILIZED, FOR SOLUTION INTRAVENOUS at 02:46

## 2018-03-03 RX ADMIN — SCOPALAMINE 1 PATCH: 1 PATCH, EXTENDED RELEASE TRANSDERMAL at 15:55

## 2018-03-03 RX ADMIN — HEPARIN SODIUM 5000 UNIT(S): 5000 INJECTION INTRAVENOUS; SUBCUTANEOUS at 08:11

## 2018-03-03 RX ADMIN — ROBINUL 0.2 MILLIGRAM(S): 0.2 INJECTION INTRAMUSCULAR; INTRAVENOUS at 15:55

## 2018-03-03 RX ADMIN — Medication 3 MILLILITER(S): at 11:20

## 2018-03-03 NOTE — CHART NOTE - NSCHARTNOTEFT_GEN_A_CORE
PGY 1 event note - withdrawal of care     called hospital at approximately 10:30am this morning and requested that life sustaining measures be withdrawn.  was advised that this would almost certainly result in death of patient, and asked if he understood the outcome.  expressed understanding and was given the opportunity to ask questions. After discussion,  spoke again with senior resident to confirm that he would like care withdrawn. Was given opportunity to ask questions and repeated that he understood the consequences of withdrawal of care.

## 2018-03-03 NOTE — DISCHARGE NOTE FOR THE EXPIRED PATIENT - HOSPITAL COURSE
62F PMH Bipolar Disorder, Schizophrenia, depression, CAD, COPD, HTN, HLD, DMII, TIA (2017) presented to [facility]  for altered mental status, found to have Na 108. Patient was admitted to the medical ICU, treated with hypertonic saline. Treatment complicated by agitation, requiring 1:1 constant observation. Patient was stepped down to RMF on  but on  at 4:30PM patient had acute onset R sided weakness c/b respiratory distress requiring intubation. Patient became hypotensive post intubation (85/56) non responsive to IVF and started on Levophed. At that time, patient with WBC 32, lactate 3.9 and started on Vanc and Zosyn for LLL PNA. CTH and CTA were done at 7:30PM and read resulted at 02:00AM, demonstrating L frontal ischemic changes and L ICA occlusion and primary team was instructed to contact [facility] for potential intervention Of note, patient was febrile T101.3 and prior to transport at T101.3 124/71 T128 (reported sinus); Tylenol was reportedly given. Per discharge documentation, most recent WB C 47.4.     At [facility], Continued on Vanc/Zosyn. Elevated troponins peaked (0.22), EKG revealing TWIs in V2-V6 (new) and incomplete RBBB in V1 (unchanged since prior EKG in 2017). R IJ central line placed. Required pressor support with Levophed. Upon admission, patient noted to have 200-400 cc of coffee ground emesis in sump pump, elevated INR, H/H stable to 11.3 w/ 1L NS given in AM, GI consulted, no intervention planned per GI. L femoral TLC removed, replaced with R IJ TLC. VEEG placed pt w/ L gaze deviation and later R gaze deviation d/w neurology. ECHO hyperdynamic, no valve dz. CT c/a/p performed with multifocal PNA, possible pyelo,. CTH w/ L MCA+FANNY CVA acute/subacute. ID consulted for concern of ?septic embolic. need cx from OSH. contacted pt , Wallace Reno. on insulin gtt for hyperglycemia. Per epilepsy, VEEG revealing only artifact but no obvious seizures. WBC downtrending, with decreased temperatures. Insulin gtt discontinued as FS improving, started on ISS. Noted to have shiveringepisodes, resolved with demerol. Patient had increasing Levo requirements.     On 3/2, CTH with evidence of large amount of cerebral edema, midline shift and herniation. discussed with family, grave prognosis. patient became DNR, do not increase levo, no blood draws or CXR. d/w dr vargas as well. +corneal, gag, resp drive. pupils 3mm and 2mm reactive to light R sluggish, L brisk. 3/3: spoke with ,. decision made to withdraw care and provide comfort medication only. stopped all medications. placed on morphine ggt and extubated. patient  in evening on 3/3.

## 2018-03-03 NOTE — PROGRESS NOTE ADULT - ATTENDING COMMENTS
Patient seen and examined with house-staff during bedside rounds.  Resident note read, including vitals, physical findings, laboratory data, and radiological reports.   Revisions included below.  Direct personal management at bed side and extensive interpretation of the data.  Plan was outlined and discussed in details with the housestaff.  Decision making of high complexity  Action taken for acute disease activity to justify level of service:  -Review the CT scan  -management of pressors   -ventilator management  -no escalation of care
Case discussed with Dr. Daugherty who will call patient's  to discuss goals of care.
I have reviewed the medical record, including laboratory and radiographic studies, interviewed and examined the patient and discussed the plan with Dr. Contreras, the ID Resident.  Agree with above. Please recall if further ID input is desired - ID service.
Pt remains comatose and not withdrawing to noxious stimuli. Right pupil 3 mm and left 2.5 mm and reactive. Left gaze preference and has Dolls eyes, gag, and bilateral corneals. Pt overbreathing vent. Continue EEG nmonitoring and f/u with epilepsy. Na 156 and urine appears less concentrated. Hypertonic saline d/sujey and watch for DI. Check urine osm and Na. Palliative care should be consulted and will discuss with Dr. Marshall with regards to goals of care discussion with pts . Prognosis grave.
Pt accepted overnight for eval of CVa and arrived in septic shock and with GI bleed. Pt unresponsive with left gaze preference off sedation and CT with large left frontal temporal parietal infarct. Pt volume resuscitated with over 3 liters NS plus 5% alb. Continue abx and femoral line from [facility] removed.  Blood cx done and to continue broad spectrum abx. Folllow CBc and cont PPI. CT chest/abd/pelvis noted and ID consulted. Prognosis grave.

## 2018-03-03 NOTE — PROGRESS NOTE ADULT - SUBJECTIVE AND OBJECTIVE BOX
INTERVAL HPI/OVERNIGHT EVENTS: No acute events overnight    SUBJECTIVE: Patient seen and examined at bedside. Unable to participate in review of systems due to neurologic status    OBJECTIVE:    VITAL SIGNS:  ICU Vital Signs Last 24 Hrs  T(C): 38.2 (03 Mar 2018 18:22), Max: 38.7 (03 Mar 2018 07:00)  T(F): 100.7 (03 Mar 2018 18:22), Max: 101.6 (03 Mar 2018 07:00)  HR: 110 (03 Mar 2018 20:00) (70 - 126)  BP: --  BP(mean): --  ABP: 74/38 (03 Mar 2018 20:00) (74/38 - 172/78)  ABP(mean): 52 (03 Mar 2018 20:00) (52 - 112)  RR: 24 (03 Mar 2018 20:00) (14 - 35)  SpO2: 99% (03 Mar 2018 16:00) (96% - 100%)    Mode: AC/ CMV (Assist Control/ Continuous Mandatory Ventilation), RR (machine): 14, TV (machine): 490, FiO2: 40, PEEP: 5, ITime: 1, MAP: 11, PIP: 22    03-02 @ 07:01  -  03-03 @ 07:00  --------------------------------------------------------  IN: 1282 mL / OUT: 2715 mL / NET: -1433 mL    03-03 @ 07:01 - 03-03 @ 20:52  --------------------------------------------------------  IN: 27 mL / OUT: 710 mL / NET: -683 mL      CAPILLARY BLOOD GLUCOSE      POCT Blood Glucose.: 189 mg/dL (02 Mar 2018 17:11)      PHYSICAL EXAM:    General: Obese F, +intubated and sedated  HEENT: NC/AT  Neck: supple; no JVD  Respiratory: Intubated, lungs CTAB, no crackles, rhonchi, or wheeze appreciated  Cardiac: +S1/S2; RRR, no m/r/g  Gastrointestinal: distended abdomen, soft. + cooling blanket in place  : + Ba  Extremities: Cool, no clubbing or cyanosis; no peripheral edema  Vascular: 1+ radial, femoral, DP  Neurologic: intubated and sedated, response to painful stimulus deferred in setting of current goals of care, pupils constricted bilaterally, fixed    MEDICATIONS:  MEDICATIONS  (STANDING):  morphine  Infusion 1 mG/Hr (1 mL/Hr) IV Continuous <Continuous>    MEDICATIONS  (PRN):  atropine 1% Ophthalmic Solution for SubLingual Use 2 Drop(s) SubLingual every 4 hours PRN excessive secretions  LORazepam   Injectable 1 milliGRAM(s) IV Push every 1 hour PRN terminal anxiety  morphine  - Injectable 4 milliGRAM(s) IV Push once PRN prior to extubation      ALLERGIES:  Allergies    Cipro (Other)  Geodon (Other)  Haldol (Other)  SULFA (Swelling)  sulfa drugs (Swelling)  ziprasidone (Unknown)    Intolerances        LABS:                        9.3    20.1  )-----------( 162      ( 02 Mar 2018 06:24 )             28.9     03-02    156<H>  |  120<H>  |  38<H>  ----------------------------<  186<H>  3.5   |  22  |  1.48<H>    Ca    8.5      02 Mar 2018 06:24  Phos  2.7     03-02  Mg     2.5     03-02    TPro  6.0  /  Alb  3.3  /  TBili  0.8  /  DBili  x   /  AST  1135<H>  /  ALT  1635<H>  /  AlkPhos  70  03-02    LIVER FUNCTIONS - ( 02 Mar 2018 06:24 )  Alb: 3.3 g/dL / Pro: 6.0 g/dL / ALK PHOS: 70 U/L / ALT: 1635 U/L / AST: 1135 U/L / GGT: x             Urinalysis Basic - ( 02 Mar 2018 12:45 )    Color: Yellow / Appearance: Clear / SG: <=1.005 / pH: x  Gluc: x / Ketone: NEGATIVE  / Bili: Negative / Urobili: 0.2 E.U./dL   Blood: x / Protein: NEGATIVE mg/dL / Nitrite: NEGATIVE   Leuk Esterase: NEGATIVE / RBC: < 5 /HPF / WBC < 5 /HPF   Sq Epi: x / Non Sq Epi: 5-10 /HPF / Bacteria: Present /HPF            RADIOLOGY & ADDITIONAL TESTS: Reviewed.    ASSESSMENT: 62F PMH Bipolar Disorder, Schizophrenia, depression, CAD, COPD, HTN, HLD, DMII, TIA (03/2017) transfer from [facility] for potential intervention for L ICA occlusion, found to be in septic shock, currently being treated for multi-focal PNA, and admitted to MICU for pressor support. Goals of care established,  wishes for care to be withdrawn as of this afternoon    ID  #SEPTIC SHOCK: Patient reportedly febrile (Tm101.3), tachycardic, hypotensive requiring pressor support with levophed at [facility] and initiated on Vanc/Zosyn. Unclear source however likely lung given CT chest findings c/w multilobar PNA. No clear micro-organism identified however continuing to treat with broad spectrum abxs. Per [facility], outside Bcx and Ucx with NGTD, WBC downtrending.  - Patient still requiring pressor support, per discussion with  this afternoon care will be withdrawn.  - Discontinued antibiotics, no labs drawn, comfort care    CARDIO  #Troponemia  Patient noted to have abnormal ECG revealing TWIs in V2-V6 as well as 1 and aVL (new, compared to prior EKG in July 2017) and an incomplete RBBB in V1 (unchanged). Troponins elevated (peaked). Abnormal ECG as well as troponins likely 2/2 demand ischemia in the setting of septic shock and CVA.  - No follow up in setting of goals of care, comfort care with withdrawal of ventilator    ENDO  #DM  Patient with known history of DM, Hgb 6.1% on arrival  - No follow up in setting of goals of care, comfort care with withdrawal of ventilator    #AG Metabolic Acidosis: AG 26 Lactate 4.7 on admission likely 2/2 to septic shock, unclear infectious etiology  - lactate cleared with IVF resuscitation    NEURO  #STROKE: patient w/ acute onset R sided weakness at 2/27 16:30 c/b resp distress s/p intubation. CTH CTA with L ICA occlusion. CTH CTA deferred as patient unstable. Pupils fixed, unresponsive to pain, R Gaze on exam. CT Head revealing acute/subacute infarcts involving the left frontal, temporal and parietal lobes along the left FANNY and MCA territories. No ICH  - Echo revealing hyperdynamic LV, mild concentric LCH, EF >75%, no interatrial shunt, no HD significant valvulopathies.   - VEEG revealing artifact, no seizures appreciated  - Repeat CT Head revealing acute, evolving FANNY, MCA, and PCA territory infarcts with no hemorrhagic transformation but marked trans-uncal and subfalcine shift from L-->R up to 1.6cm, all of which represents a marked interval change,  Interval development of marked enlargement of the right temporal horn suggestive of entrapment.  - Patient's  understands prognosis and has indicated that he would like ventilator withdrawn and patient made full comfort care.     GI  #Coffee Ground Emesis  Sump pump suctioning approx. 200-400 cc on arrival, GI recommending no endoscopic intervention.   - No follow up in setting of goals of care, comfort care with withdrawal of ventilator      HEME  #Coagulopathy  Patient with elevated INR on admission, unclear etiology, s/p 2 u FFP and 10 mg IV Vitamin K on 2/28  - No follow up in setting of goals of care, comfort care with withdrawal of ventilator    PULM  #Acute Respiratory Failure: s/p intubation for reported desaturation during rapid response for R sided weakness. Per discharge, ?LLL consolidation. Currently on AC/VC  - Ventilator withdrawn this afternoon    RENAL  PATSY: BUN: Cr 56:2.73, unclear baseline, Calculated FeNa c/w pre-renal likely 2/2 hypovolemia and septic shock. sCr downtrending with IVF resuscitation.   - No follow up in setting of goals of care, comfort care with withdrawal of ventilator    PSYCH:  #Schizophrenia: known h/o schizophrenia, with reported agitation at hospital requiring 1:1, now intubated.   - No follow up in setting of goals of care, comfort care with withdrawal of ventilator    PALLIATIVE  Discussed with patient's  regarding poor neurologic prognosis given most recent CT findings. Patient's  in agreement that escalating care would not be appropriate and that patient should be made DNR. Patient's  asked for withdrawal of ventilator 3/3.     FEN  F: Comfort care, pain control  E: No follow up in setting of goals of care, comfort care with withdrawal of ventilator  N: None      DNR/DNI  CONTACT: Spouse [phone] (per chart)

## 2018-03-05 LAB
CULTURE RESULTS: SIGNIFICANT CHANGE UP
CULTURE RESULTS: SIGNIFICANT CHANGE UP
SPECIMEN SOURCE: SIGNIFICANT CHANGE UP
SPECIMEN SOURCE: SIGNIFICANT CHANGE UP

## 2018-03-08 DIAGNOSIS — J96.00 ACUTE RESPIRATORY FAILURE, UNSPECIFIED WHETHER WITH HYPOXIA OR HYPERCAPNIA: ICD-10-CM

## 2018-03-08 DIAGNOSIS — I73.9 PERIPHERAL VASCULAR DISEASE, UNSPECIFIED: ICD-10-CM

## 2018-03-08 DIAGNOSIS — E11.10 TYPE 2 DIABETES MELLITUS WITH KETOACIDOSIS WITHOUT COMA: ICD-10-CM

## 2018-03-08 DIAGNOSIS — K92.0 HEMATEMESIS: ICD-10-CM

## 2018-03-08 DIAGNOSIS — F31.9 BIPOLAR DISORDER, UNSPECIFIED: ICD-10-CM

## 2018-03-08 DIAGNOSIS — R65.21 SEVERE SEPSIS WITH SEPTIC SHOCK: ICD-10-CM

## 2018-03-08 DIAGNOSIS — A41.9 SEPSIS, UNSPECIFIED ORGANISM: ICD-10-CM

## 2018-03-08 DIAGNOSIS — J69.0 PNEUMONITIS DUE TO INHALATION OF FOOD AND VOMIT: ICD-10-CM

## 2018-03-08 DIAGNOSIS — G93.6 CEREBRAL EDEMA: ICD-10-CM

## 2018-03-08 DIAGNOSIS — G92 TOXIC ENCEPHALOPATHY: ICD-10-CM

## 2018-03-08 DIAGNOSIS — N83.201 UNSPECIFIED OVARIAN CYST, RIGHT SIDE: ICD-10-CM

## 2018-03-08 DIAGNOSIS — Z79.82 LONG TERM (CURRENT) USE OF ASPIRIN: ICD-10-CM

## 2018-03-08 DIAGNOSIS — R56.9 UNSPECIFIED CONVULSIONS: ICD-10-CM

## 2018-03-08 DIAGNOSIS — Z66 DO NOT RESUSCITATE: ICD-10-CM

## 2018-03-08 DIAGNOSIS — I10 ESSENTIAL (PRIMARY) HYPERTENSION: ICD-10-CM

## 2018-03-08 DIAGNOSIS — Z79.84 LONG TERM (CURRENT) USE OF ORAL HYPOGLYCEMIC DRUGS: ICD-10-CM

## 2018-03-08 DIAGNOSIS — I63.9 CEREBRAL INFARCTION, UNSPECIFIED: ICD-10-CM

## 2018-03-08 DIAGNOSIS — N83.202 UNSPECIFIED OVARIAN CYST, LEFT SIDE: ICD-10-CM

## 2018-03-08 DIAGNOSIS — D68.9 COAGULATION DEFECT, UNSPECIFIED: ICD-10-CM
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
## 2018-03-08 DIAGNOSIS — M79.7 FIBROMYALGIA: ICD-10-CM

## 2018-03-08 DIAGNOSIS — Z88.1 ALLERGY STATUS TO OTHER ANTIBIOTIC AGENTS STATUS: ICD-10-CM

## 2018-03-08 DIAGNOSIS — I25.10 ATHEROSCLEROTIC HEART DISEASE OF NATIVE CORONARY ARTERY WITHOUT ANGINA PECTORIS: ICD-10-CM

## 2018-03-08 DIAGNOSIS — F25.9 SCHIZOAFFECTIVE DISORDER, UNSPECIFIED: ICD-10-CM

## 2018-03-08 DIAGNOSIS — N17.9 ACUTE KIDNEY FAILURE, UNSPECIFIED: ICD-10-CM

## 2018-03-08 DIAGNOSIS — J44.9 CHRONIC OBSTRUCTIVE PULMONARY DISEASE, UNSPECIFIED: ICD-10-CM

## 2018-03-08 DIAGNOSIS — Z88.2 ALLERGY STATUS TO SULFONAMIDES: ICD-10-CM

## 2019-11-11 NOTE — PROCEDURE NOTE - NSCVLATTEMPTSITEVASC_A_CORE
How Severe Is Your Skin Lesion?: moderate
Have Your Skin Lesions Been Treated?: not been treated
Is This A New Presentation, Or A Follow-Up?: Skin Lesions
internal jugular/right

## 2022-03-29 NOTE — PATIENT PROFILE ADULT. - NSSUBSTANCEUSE_GEN_ALL_CORE_SD
Patient did not return for cffDNA redraw. Given current gestational age, order canceled.  If appropriate testing can be ordered on baby after delivery   never used

## 2024-10-09 NOTE — H&P ADULT - FAMILY HISTORY
no
Sibling  Still living? Unknown  Family history of pancreatic cancer, Age at diagnosis: Age Unknown  Family history of breast cancer, Age at diagnosis: Age Unknown  Family history of prostate cancer, Age at diagnosis: Age Unknown

## 2024-11-20 NOTE — PATIENT PROFILE ADULT. - NS PRO AD NO ADVANCE DIRECTIVE
unable to assess
R knee flexion 0-90 degrees/bilateral upper extremity ROM was WFL (within functional limits)/bilateral lower extremity ROM was WFL (within functional limits)